# Patient Record
Sex: MALE | Race: WHITE | NOT HISPANIC OR LATINO | Employment: OTHER | URBAN - METROPOLITAN AREA
[De-identification: names, ages, dates, MRNs, and addresses within clinical notes are randomized per-mention and may not be internally consistent; named-entity substitution may affect disease eponyms.]

---

## 2017-05-19 ENCOUNTER — OFFICE VISIT (OUTPATIENT)
Dept: FAMILY MEDICINE CLINIC | Facility: CLINIC | Age: 82
End: 2017-05-19

## 2017-05-19 VITALS
HEART RATE: 69 BPM | BODY MASS INDEX: 22.82 KG/M2 | OXYGEN SATURATION: 98 % | DIASTOLIC BLOOD PRESSURE: 90 MMHG | WEIGHT: 163 LBS | TEMPERATURE: 98 F | SYSTOLIC BLOOD PRESSURE: 142 MMHG | HEIGHT: 71 IN

## 2017-05-19 DIAGNOSIS — D51.8 OTHER VITAMIN B12 DEFICIENCY ANEMIA: ICD-10-CM

## 2017-05-19 DIAGNOSIS — I10 ESSENTIAL HYPERTENSION: Primary | ICD-10-CM

## 2017-05-19 DIAGNOSIS — R19.7 DIARRHEA OF PRESUMED INFECTIOUS ORIGIN: ICD-10-CM

## 2017-05-19 LAB
ALBUMIN SERPL-MCNC: 3.9 G/DL (ref 3.5–5.2)
ALBUMIN/GLOB SERPL: 1.9 G/DL
ALP SERPL-CCNC: 53 U/L (ref 39–117)
ALT SERPL-CCNC: 12 U/L (ref 1–41)
AST SERPL-CCNC: 11 U/L (ref 1–40)
BASOPHILS # BLD AUTO: 0.04 10*3/MM3 (ref 0–0.2)
BASOPHILS NFR BLD AUTO: 0.6 % (ref 0–1.5)
BILIRUB SERPL-MCNC: 1 MG/DL (ref 0.1–1.2)
BUN SERPL-MCNC: 16 MG/DL (ref 8–23)
BUN/CREAT SERPL: 19.8 (ref 7–25)
CALCIUM SERPL-MCNC: 9.2 MG/DL (ref 8.2–9.6)
CHLORIDE SERPL-SCNC: 96 MMOL/L (ref 98–107)
CO2 SERPL-SCNC: 31.9 MMOL/L (ref 22–29)
CREAT SERPL-MCNC: 0.81 MG/DL (ref 0.76–1.27)
EOSINOPHIL # BLD AUTO: 0.08 10*3/MM3 (ref 0–0.7)
EOSINOPHIL NFR BLD AUTO: 1.2 % (ref 0.3–6.2)
ERYTHROCYTE [DISTWIDTH] IN BLOOD BY AUTOMATED COUNT: 14.8 % (ref 11.5–14.5)
GLOBULIN SER CALC-MCNC: 2.1 GM/DL
GLUCOSE SERPL-MCNC: 87 MG/DL (ref 65–99)
HCT VFR BLD AUTO: 40.2 % (ref 40.4–52.2)
HGB BLD-MCNC: 13.2 G/DL (ref 13.7–17.6)
IMM GRANULOCYTES # BLD: 0.02 10*3/MM3 (ref 0–0.03)
IMM GRANULOCYTES NFR BLD: 0.3 % (ref 0–0.5)
LYMPHOCYTES # BLD AUTO: 1.95 10*3/MM3 (ref 0.9–4.8)
LYMPHOCYTES NFR BLD AUTO: 28.9 % (ref 19.6–45.3)
MCH RBC QN AUTO: 30.8 PG (ref 27–32.7)
MCHC RBC AUTO-ENTMCNC: 32.8 G/DL (ref 32.6–36.4)
MCV RBC AUTO: 93.9 FL (ref 79.8–96.2)
MONOCYTES # BLD AUTO: 0.6 10*3/MM3 (ref 0.2–1.2)
MONOCYTES NFR BLD AUTO: 8.9 % (ref 5–12)
NEUTROPHILS # BLD AUTO: 4.06 10*3/MM3 (ref 1.9–8.1)
NEUTROPHILS NFR BLD AUTO: 60.1 % (ref 42.7–76)
PLATELET # BLD AUTO: 241 10*3/MM3 (ref 140–500)
POTASSIUM SERPL-SCNC: 4 MMOL/L (ref 3.5–5.2)
PROT SERPL-MCNC: 6 G/DL (ref 6–8.5)
RBC # BLD AUTO: 4.28 10*6/MM3 (ref 4.6–6)
SODIUM SERPL-SCNC: 139 MMOL/L (ref 136–145)
WBC # BLD AUTO: 6.75 10*3/MM3 (ref 4.5–10.7)

## 2017-05-19 PROCEDURE — 99213 OFFICE O/P EST LOW 20 MIN: CPT | Performed by: FAMILY MEDICINE

## 2017-08-23 ENCOUNTER — OFFICE VISIT (OUTPATIENT)
Dept: FAMILY MEDICINE CLINIC | Facility: CLINIC | Age: 82
End: 2017-08-23

## 2017-08-23 VITALS
SYSTOLIC BLOOD PRESSURE: 122 MMHG | HEART RATE: 75 BPM | OXYGEN SATURATION: 95 % | DIASTOLIC BLOOD PRESSURE: 78 MMHG | HEIGHT: 71 IN | BODY MASS INDEX: 21 KG/M2 | WEIGHT: 150 LBS | TEMPERATURE: 98.7 F

## 2017-08-23 DIAGNOSIS — N62 GYNECOMASTIA, MALE: Primary | ICD-10-CM

## 2017-08-23 PROCEDURE — 99213 OFFICE O/P EST LOW 20 MIN: CPT | Performed by: FAMILY MEDICINE

## 2017-08-23 NOTE — PROGRESS NOTES
Subjective   Nicolas Wilde is a 89 y.o. male presenting with   Chief Complaint   Patient presents with   • Lump     right breast         HPI Comments: This is a 89-year-old  white male nonsmoker who comes in today for evaluation of unilateral gynecomastia in the right breast.  He says that he has several lipomas and he is seen his dermatologist, Dr. Arnold, who thought it might possibly simply be a lipoma but that it deserves further evaluation.  We discussed the different imaging techniques and I told him that CT would involve some radiation an MRI takes a long time and is uncomfortable in the positioning normally used for breast MRI.  I told him I thought the simplest and easiest would be an with.       The following portions of the patient's history were reviewed and updated as appropriate: current medications, past family history, past medical history, past social history, past surgical history and problem list.    Review of Systems   Skin: Wound: breast lump.   All other systems reviewed and are negative.      Objective   Physical Exam   Constitutional: He is oriented to person, place, and time. He appears well-developed and well-nourished. No distress.   HENT:   Head: Normocephalic and atraumatic.   Eyes: EOM are normal. Pupils are equal, round, and reactive to light.   Neck: Normal range of motion. Neck supple. No thyromegaly present.   Musculoskeletal: Normal range of motion.   Lymphadenopathy:     He has no cervical adenopathy.   Neurological: He is alert and oriented to person, place, and time. No cranial nerve deficit. Coordination normal.   Skin: Skin is warm and dry. Lesion (3 cm soft well-circumscribed mobile but somewhat firm subcutaneous lesion beneath the right areola.) noted. He is not diaphoretic.        Psychiatric: He has a normal mood and affect. His behavior is normal.   Nursing note and vitals reviewed.      Assessment/Plan   Nicolas was seen today for lump.    Diagnoses and all  orders for this visit:    Gynecomastia, male  -     US breast right limited                   I would like him to return for another visit in 6 month(s)

## 2017-08-23 NOTE — PATIENT INSTRUCTIONS
This is a very nice 89 year old with unilateral gynecomastia.  I will request an ultrasound and notify him when the results are available.  I also will fax the results to his dermatologist, Dr. Arnold.

## 2017-08-25 DIAGNOSIS — N63.0 BREAST MASS IN MALE: Primary | ICD-10-CM

## 2017-08-28 DIAGNOSIS — N63.10 BREAST MASS, RIGHT: Primary | ICD-10-CM

## 2017-08-29 ENCOUNTER — HOSPITAL ENCOUNTER (OUTPATIENT)
Dept: MAMMOGRAPHY | Facility: HOSPITAL | Age: 82
Discharge: HOME | End: 2017-08-29
Admitting: FAMILY MEDICINE

## 2017-08-29 ENCOUNTER — HOSPITAL ENCOUNTER (OUTPATIENT)
Dept: ULTRASOUND IMAGING | Facility: HOSPITAL | Age: 82
Discharge: HOME | End: 2017-08-29

## 2017-08-29 DIAGNOSIS — N63.10 BREAST MASS, RIGHT: ICD-10-CM

## 2017-08-29 PROCEDURE — G0204 DX MAMMO INCL CAD BI: HCPCS

## 2017-08-29 PROCEDURE — 76642 ULTRASOUND BREAST LIMITED: CPT

## 2017-08-30 DIAGNOSIS — N63.10 BREAST MASS, RIGHT: Primary | ICD-10-CM

## 2017-09-12 ENCOUNTER — HOSPITAL ENCOUNTER (OUTPATIENT)
Dept: ULTRASOUND IMAGING | Facility: HOSPITAL | Age: 82
Discharge: HOME | End: 2017-09-12
Attending: SURGERY | Admitting: SURGERY

## 2017-09-12 VITALS
HEART RATE: 67 BPM | DIASTOLIC BLOOD PRESSURE: 90 MMHG | BODY MASS INDEX: 21.47 KG/M2 | RESPIRATION RATE: 18 BRPM | WEIGHT: 150 LBS | HEIGHT: 70 IN | SYSTOLIC BLOOD PRESSURE: 170 MMHG | OXYGEN SATURATION: 100 % | TEMPERATURE: 96.4 F

## 2017-09-12 DIAGNOSIS — R92.8 ABNORMAL MAMMOGRAM, UNSPECIFIED: ICD-10-CM

## 2017-09-12 PROCEDURE — 88342 IMHCHEM/IMCYTCHM 1ST ANTB: CPT | Performed by: SURGERY

## 2017-09-12 PROCEDURE — 88341 IMHCHEM/IMCYTCHM EA ADD ANTB: CPT | Performed by: SURGERY

## 2017-09-12 PROCEDURE — 88305 TISSUE EXAM BY PATHOLOGIST: CPT | Performed by: SURGERY

## 2017-09-12 RX ORDER — LIDOCAINE HYDROCHLORIDE 10 MG/ML
10 INJECTION, SOLUTION INFILTRATION; PERINEURAL ONCE
Status: COMPLETED | OUTPATIENT
Start: 2017-09-12 | End: 2017-09-12

## 2017-09-12 RX ADMIN — LIDOCAINE HYDROCHLORIDE 2 ML: 10 INJECTION, SOLUTION INFILTRATION; PERINEURAL at 14:48

## 2017-09-12 NOTE — NURSING NOTE
Biopsy site to right breast clear with Skin Affix dry and intact. No firmness or swelling noted at or around biopsy site. Denies pain. Ice pack with protective covering applied to biopsy site. Applied a wide Ace pressure wrap to hold ice pack in place. Discharge instructions discussed with understanding voiced by patient and his wife. Copies provided to patient. No distress noted. Wife in wheelchair and assisted to  via wheelchair per this nurse. Patient ambulatory along side nurse. To home via private vehicle.

## 2017-09-12 NOTE — NURSING NOTE
Correction to prior note. Call to Dr. Weston to advise that patient is ready to be consented and marked.

## 2017-09-15 ENCOUNTER — PREP FOR SURGERY (OUTPATIENT)
Dept: OTHER | Facility: HOSPITAL | Age: 82
End: 2017-09-15

## 2017-09-15 ENCOUNTER — TRANSCRIBE ORDERS (OUTPATIENT)
Dept: PERIOP | Facility: HOSPITAL | Age: 82
End: 2017-09-15

## 2017-09-15 DIAGNOSIS — C50.919 BREAST CANCER (HCC): Primary | ICD-10-CM

## 2017-09-15 DIAGNOSIS — C50.121 MALIGNANT NEOPLASM OF CENTRAL PORTION OF RIGHT MALE BREAST (HCC): Primary | ICD-10-CM

## 2017-09-15 RX ORDER — CLINDAMYCIN PHOSPHATE 900 MG/50ML
900 INJECTION INTRAVENOUS ONCE
Status: CANCELLED | OUTPATIENT
Start: 2017-09-20 | End: 2017-09-15

## 2017-09-19 ENCOUNTER — APPOINTMENT (OUTPATIENT)
Dept: PREADMISSION TESTING | Facility: HOSPITAL | Age: 82
End: 2017-09-19

## 2017-09-19 ENCOUNTER — PREP FOR SURGERY (OUTPATIENT)
Dept: OTHER | Facility: HOSPITAL | Age: 82
End: 2017-09-19

## 2017-09-19 ENCOUNTER — HOSPITAL ENCOUNTER (OUTPATIENT)
Dept: NUCLEAR MEDICINE | Facility: HOSPITAL | Age: 82
End: 2017-09-19
Attending: SURGERY

## 2017-09-19 VITALS
DIASTOLIC BLOOD PRESSURE: 81 MMHG | HEART RATE: 80 BPM | HEIGHT: 70 IN | BODY MASS INDEX: 23.19 KG/M2 | OXYGEN SATURATION: 98 % | SYSTOLIC BLOOD PRESSURE: 146 MMHG | RESPIRATION RATE: 16 BRPM | WEIGHT: 162 LBS | TEMPERATURE: 97.7 F

## 2017-09-19 DIAGNOSIS — C50.111 CANCER OF CENTRAL PORTION OF RIGHT BREAST (HCC): Primary | ICD-10-CM

## 2017-09-19 LAB
ANION GAP SERPL CALCULATED.3IONS-SCNC: 9.6 MMOL/L
BUN BLD-MCNC: 21 MG/DL (ref 8–23)
BUN/CREAT SERPL: 28.4 (ref 7–25)
CALCIUM SPEC-SCNC: 9.1 MG/DL (ref 8.2–9.6)
CHLORIDE SERPL-SCNC: 99 MMOL/L (ref 98–107)
CO2 SERPL-SCNC: 31.4 MMOL/L (ref 22–29)
CREAT BLD-MCNC: 0.74 MG/DL (ref 0.76–1.27)
DEPRECATED RDW RBC AUTO: 51.2 FL (ref 37–54)
ERYTHROCYTE [DISTWIDTH] IN BLOOD BY AUTOMATED COUNT: 14.3 % (ref 11.5–14.5)
GFR SERPL CREATININE-BSD FRML MDRD: 99 ML/MIN/1.73
GLUCOSE BLD-MCNC: 131 MG/DL (ref 65–99)
HCT VFR BLD AUTO: 41.3 % (ref 40.4–52.2)
HGB BLD-MCNC: 13.1 G/DL (ref 13.7–17.6)
MCH RBC QN AUTO: 30.8 PG (ref 27–32.7)
MCHC RBC AUTO-ENTMCNC: 31.7 G/DL (ref 32.6–36.4)
MCV RBC AUTO: 97.2 FL (ref 79.8–96.2)
PLATELET # BLD AUTO: 202 10*3/MM3 (ref 140–500)
PMV BLD AUTO: 9.8 FL (ref 6–12)
POTASSIUM BLD-SCNC: 3.5 MMOL/L (ref 3.5–5.2)
RBC # BLD AUTO: 4.25 10*6/MM3 (ref 4.6–6)
SODIUM BLD-SCNC: 140 MMOL/L (ref 136–145)
WBC NRBC COR # BLD: 6.83 10*3/MM3 (ref 4.5–10.7)

## 2017-09-19 PROCEDURE — 36415 COLL VENOUS BLD VENIPUNCTURE: CPT

## 2017-09-19 PROCEDURE — 93005 ELECTROCARDIOGRAM TRACING: CPT

## 2017-09-19 PROCEDURE — 80048 BASIC METABOLIC PNL TOTAL CA: CPT | Performed by: SURGERY

## 2017-09-19 PROCEDURE — 85027 COMPLETE CBC AUTOMATED: CPT | Performed by: SURGERY

## 2017-09-19 PROCEDURE — 93010 ELECTROCARDIOGRAM REPORT: CPT | Performed by: INTERNAL MEDICINE

## 2017-09-19 NOTE — DISCHARGE INSTRUCTIONS
Take the following medications the morning of surgery with a small sip of water: NONE        General Instructions:  • Do not eat or drink anything after midnight the night before surgery.  • Bring any papers given to you in the doctor’s office.  • Wear clean comfortable clothes and socks.  • Do not wear contact lenses or make-up.  Bring a case for your glasses.   • Bring crutches or walker if applicable.  • Leave all other valuables and jewelry at home.  • The Pre-Admission Testing nurse will instruct you to bring medications if unable to obtain an accurate list in Pre-Admission Testing.          Preventing a Surgical Site Infection:  • For 2 to 3 days before surgery, avoid shaving with a razor because the razor can irritate skin and make it easier to develop an infection.  • The night prior to surgery sleep in a clean bed with clean clothing.  Do not allow pets to sleep with you.  • Shower on the morning of surgery using a fresh bar of anti-bacterial soap (such as Dial) and clean washcloth.  Dry with a clean towel and dress in clean clothing.  • Ask your surgeon if you will be receiving antibiotics prior to surgery.  • Make sure you, your family, and all healthcare providers clean their hands with soap and water or an alcohol based hand  before caring for you or your wound.    Day of surgery: 9/20/2017. [facility] ARRIVAL TIME 630 AM  Upon arrival, a Pre-op nurse and Anesthesiologist will review your health history, obtain vital signs, and answer questions you may have.  The only belongings needed at this time will be your home medications and if applicable your C-PAP/BI-PAP machine.  If you are staying overnight your family can leave the rest of your belongings in the car and bring them to your room later.  A Pre-op nurse will start an IV and you may receive medication in preparation for surgery, including something to help you relax.  Your family will be able to see you in the Pre-op area.  While you are in  surgery your family should notify the waiting room  if they leave the waiting room area and provide a contact phone number.    Please be aware that surgery does come with discomfort.  We want to make every effort to control your discomfort so please discuss any uncontrolled symptoms with your nurse.   Your doctor will most likely have prescribed pain medications.      If you are going home after surgery you will receive individualized written care instructions before being discharged.  A responsible adult must drive you to and from the hospital on the day of your surgery and stay with you for 24 hours.        If you have any questions please call Pre-Admission Testing at [phone].  Deductibles and co-payments are collected on the day of service. Please be prepared to pay the required co-pay, deductible or deposit on the day of service as defined by your plan.

## 2017-09-19 NOTE — H&P
H&P    BUFFY MARSH  YOB: 1926  DATE OF SERVICE:  08/31/2017        CONSULTING PHYSICIAN:  Marito Cazares MD    REQUESTING PHYSICIAN:  Evans Porter MD    REASON FOR CONSULTATION:  Right breast mass.    HISTORY:  The patient is a 89-year-old young man, who still has a good quality of life and actually cares for his sick wife at home and who comes in with a mass in his right breast behind the nipple.  Apparently, he has not been eating well of late because he is doing so much to take care of his wife and he often forgets to eat especially when she does not eat.  He has lost a fair amount of weight related to that and it has resulted in a loss of chest wall fat and muscle.  This resulted in his ability to palpate a mass right behind the nipple on the right and then he went to his primary physician about the same.  He went and had a mammogram as well as ultrasound, which showed it to be a 1.8 x 2 cm abnormality that was confirmed at 1.6 cm on ultrasound and it was felt to be suspicious and biopsy was recommended.  Further questioning reveals no nipple discharge, no breast pain, no family history of breast disease or cancer.  He denies any trauma to the area.  He has never had any problems on the opposite side.  He has no axillary complaints.    PAST MEDICAL HISTORY:  Fairly unremarkable despite his age.  It looks like he does not take any chronic medications for chronic illness and has never had major surgery.    SOCIAL HISTORY:  The patient is retired, lives independently, and cares for his sick wife as mentioned above.  Drinks wine and quit meals.  Does not smoke.    FAMILY HISTORY:  Noncontributory to current condition.    REVIEW OF SYSTEMS:  A 12-point review of systems is reviewed, documented, and negative in the chart with the exception of some chronic back pain and some chronic hearing loss.    PHYSICAL EXAMINATION:  GENERAL:  Reveals an elderly gentleman, in no acute distress, alert,  oriented, and afebrile.  HEENT:  Head normocephalic.  External ears normal.  Sclerae nonicteric.  NECK:  Supple without thyromegaly.  CHEST:  Clear with good respiratory effort bilaterally.  Bilateral chest exam does reveal a retroareolar mass on the right about 2 cm in size, hard, but without any overlying abnormal skin changes.  There might be some retraction of the nipple.  Axillary region especially on the right are negative.  HEART:  Regular without murmur.  ABDOMEN:  Soft.  SKIN:  Negative.  RECTAL:  Deferred.    IMPRESSION:  Suspicious right breast mass.    PLAN:  I gave the patient several options, but I think if he is going to come to surgery being 89 years old one surgery would be better than two, so I think a minimally invasive biopsy might be his best bet moving forward.  It might even afford us the opportunity to miss surgery altogether.  I will schedule that procedure and further recommendations will follow.    ADDENDUM:  Bx +Ductal CA.  Discussed with pt and his oncologist and recommended simple mastectomy with sentinel node bx.          Marito Elliott M.D.  NATO: stephanie/vss

## 2017-09-20 ENCOUNTER — HOSPITAL ENCOUNTER (OUTPATIENT)
Dept: NUCLEAR MEDICINE | Facility: HOSPITAL | Age: 82
Discharge: HOME | End: 2017-09-20
Attending: SURGERY

## 2017-09-20 ENCOUNTER — ANESTHESIA EVENT (OUTPATIENT)
Dept: PERIOP | Facility: HOSPITAL | Age: 82
End: 2017-09-20

## 2017-09-20 ENCOUNTER — ANESTHESIA (OUTPATIENT)
Dept: PERIOP | Facility: HOSPITAL | Age: 82
End: 2017-09-20

## 2017-09-20 ENCOUNTER — HOSPITAL ENCOUNTER (OUTPATIENT)
Facility: HOSPITAL | Age: 82
Setting detail: HOSPITAL OUTPATIENT SURGERY
Discharge: HOME | End: 2017-09-20
Attending: SURGERY | Admitting: SURGERY

## 2017-09-20 VITALS
HEART RATE: 68 BPM | DIASTOLIC BLOOD PRESSURE: 71 MMHG | TEMPERATURE: 98.4 F | OXYGEN SATURATION: 97 % | SYSTOLIC BLOOD PRESSURE: 124 MMHG | RESPIRATION RATE: 16 BRPM

## 2017-09-20 DIAGNOSIS — C50.919 BREAST CANCER (HCC): ICD-10-CM

## 2017-09-20 DIAGNOSIS — C50.121 MALIGNANT NEOPLASM OF CENTRAL PORTION OF RIGHT MALE BREAST (HCC): ICD-10-CM

## 2017-09-20 PROCEDURE — 25010000002 DEXAMETHASONE PER 1 MG: Performed by: NURSE ANESTHETIST, CERTIFIED REGISTERED

## 2017-09-20 PROCEDURE — 88332 PATH CONSLTJ SURG EA ADD BLK: CPT | Performed by: SURGERY

## 2017-09-20 PROCEDURE — 88307 TISSUE EXAM BY PATHOLOGIST: CPT | Performed by: SURGERY

## 2017-09-20 PROCEDURE — 38792 RA TRACER ID OF SENTINL NODE: CPT

## 2017-09-20 PROCEDURE — 25010000002 NALOXONE PER 1 MG: Performed by: NURSE ANESTHETIST, CERTIFIED REGISTERED

## 2017-09-20 PROCEDURE — 88331 PATH CONSLTJ SURG 1 BLK 1SPC: CPT | Performed by: SURGERY

## 2017-09-20 PROCEDURE — A9541 TC99M SULFUR COLLOID: HCPCS | Performed by: SURGERY

## 2017-09-20 PROCEDURE — 25010000002 PROPOFOL 10 MG/ML EMULSION: Performed by: NURSE ANESTHETIST, CERTIFIED REGISTERED

## 2017-09-20 PROCEDURE — 25010000002 FENTANYL CITRATE (PF) 100 MCG/2ML SOLUTION: Performed by: NURSE ANESTHETIST, CERTIFIED REGISTERED

## 2017-09-20 PROCEDURE — 25010000002 ONDANSETRON PER 1 MG: Performed by: NURSE ANESTHETIST, CERTIFIED REGISTERED

## 2017-09-20 PROCEDURE — 0 TECHNETIUM FILTERED SULFUR COLLOID: Performed by: SURGERY

## 2017-09-20 PROCEDURE — 25010000002 FENTANYL CITRATE (PF) 100 MCG/2ML SOLUTION: Performed by: ANESTHESIOLOGY

## 2017-09-20 RX ORDER — MAGNESIUM HYDROXIDE 1200 MG/15ML
LIQUID ORAL AS NEEDED
Status: DISCONTINUED | OUTPATIENT
Start: 2017-09-20 | End: 2017-09-20 | Stop reason: HOSPADM

## 2017-09-20 RX ORDER — FAMOTIDINE 10 MG/ML
20 INJECTION, SOLUTION INTRAVENOUS ONCE
Status: COMPLETED | OUTPATIENT
Start: 2017-09-20 | End: 2017-09-20

## 2017-09-20 RX ORDER — PROMETHAZINE HYDROCHLORIDE 25 MG/ML
12.5 INJECTION, SOLUTION INTRAMUSCULAR; INTRAVENOUS ONCE AS NEEDED
Status: DISCONTINUED | OUTPATIENT
Start: 2017-09-20 | End: 2017-09-20 | Stop reason: HOSPADM

## 2017-09-20 RX ORDER — OXYCODONE AND ACETAMINOPHEN 7.5; 325 MG/1; MG/1
1 TABLET ORAL ONCE AS NEEDED
Status: DISCONTINUED | OUTPATIENT
Start: 2017-09-20 | End: 2017-09-20 | Stop reason: HOSPADM

## 2017-09-20 RX ORDER — DIPHENHYDRAMINE HYDROCHLORIDE 50 MG/ML
12.5 INJECTION INTRAMUSCULAR; INTRAVENOUS
Status: DISCONTINUED | OUTPATIENT
Start: 2017-09-20 | End: 2017-09-20 | Stop reason: HOSPADM

## 2017-09-20 RX ORDER — ONDANSETRON 2 MG/ML
4 INJECTION INTRAMUSCULAR; INTRAVENOUS ONCE AS NEEDED
Status: DISCONTINUED | OUTPATIENT
Start: 2017-09-20 | End: 2017-09-20 | Stop reason: HOSPADM

## 2017-09-20 RX ORDER — HYDRALAZINE HYDROCHLORIDE 20 MG/ML
5 INJECTION INTRAMUSCULAR; INTRAVENOUS
Status: DISCONTINUED | OUTPATIENT
Start: 2017-09-20 | End: 2017-09-20 | Stop reason: HOSPADM

## 2017-09-20 RX ORDER — HYDROCODONE BITARTRATE AND ACETAMINOPHEN 7.5; 325 MG/1; MG/1
1 TABLET ORAL EVERY 4 HOURS PRN
Qty: 25 TABLET | Refills: 0 | Status: SHIPPED | OUTPATIENT
Start: 2017-09-20 | End: 2017-10-15

## 2017-09-20 RX ORDER — PROMETHAZINE HYDROCHLORIDE 25 MG/1
12.5 TABLET ORAL ONCE AS NEEDED
Status: DISCONTINUED | OUTPATIENT
Start: 2017-09-20 | End: 2017-09-20 | Stop reason: HOSPADM

## 2017-09-20 RX ORDER — FENTANYL CITRATE 50 UG/ML
50 INJECTION, SOLUTION INTRAMUSCULAR; INTRAVENOUS
Status: DISCONTINUED | OUTPATIENT
Start: 2017-09-20 | End: 2017-09-20 | Stop reason: HOSPADM

## 2017-09-20 RX ORDER — PROMETHAZINE HYDROCHLORIDE 25 MG/1
25 TABLET ORAL ONCE AS NEEDED
Status: DISCONTINUED | OUTPATIENT
Start: 2017-09-20 | End: 2017-09-20 | Stop reason: HOSPADM

## 2017-09-20 RX ORDER — CLINDAMYCIN PHOSPHATE 900 MG/50ML
900 INJECTION INTRAVENOUS ONCE
Status: COMPLETED | OUTPATIENT
Start: 2017-09-20 | End: 2017-09-20

## 2017-09-20 RX ORDER — ROCURONIUM BROMIDE 10 MG/ML
INJECTION, SOLUTION INTRAVENOUS AS NEEDED
Status: DISCONTINUED | OUTPATIENT
Start: 2017-09-20 | End: 2017-09-20 | Stop reason: SURG

## 2017-09-20 RX ORDER — BUPIVACAINE HYDROCHLORIDE AND EPINEPHRINE 2.5; 5 MG/ML; UG/ML
INJECTION, SOLUTION INFILTRATION; PERINEURAL AS NEEDED
Status: DISCONTINUED | OUTPATIENT
Start: 2017-09-20 | End: 2017-09-20 | Stop reason: HOSPADM

## 2017-09-20 RX ORDER — LABETALOL HYDROCHLORIDE 5 MG/ML
5 INJECTION, SOLUTION INTRAVENOUS
Status: DISCONTINUED | OUTPATIENT
Start: 2017-09-20 | End: 2017-09-20 | Stop reason: HOSPADM

## 2017-09-20 RX ORDER — HYDROCODONE BITARTRATE AND ACETAMINOPHEN 7.5; 325 MG/1; MG/1
1 TABLET ORAL ONCE AS NEEDED
Status: DISCONTINUED | OUTPATIENT
Start: 2017-09-20 | End: 2017-09-20 | Stop reason: HOSPADM

## 2017-09-20 RX ORDER — NALOXONE HCL 0.4 MG/ML
0.2 VIAL (ML) INJECTION AS NEEDED
Status: DISCONTINUED | OUTPATIENT
Start: 2017-09-20 | End: 2017-09-20 | Stop reason: HOSPADM

## 2017-09-20 RX ORDER — FLUMAZENIL 0.1 MG/ML
0.2 INJECTION INTRAVENOUS AS NEEDED
Status: DISCONTINUED | OUTPATIENT
Start: 2017-09-20 | End: 2017-09-20 | Stop reason: HOSPADM

## 2017-09-20 RX ORDER — HYDROMORPHONE HYDROCHLORIDE 1 MG/ML
0.5 INJECTION, SOLUTION INTRAMUSCULAR; INTRAVENOUS; SUBCUTANEOUS
Status: DISCONTINUED | OUTPATIENT
Start: 2017-09-20 | End: 2017-09-20 | Stop reason: HOSPADM

## 2017-09-20 RX ORDER — PROPOFOL 10 MG/ML
VIAL (ML) INTRAVENOUS AS NEEDED
Status: DISCONTINUED | OUTPATIENT
Start: 2017-09-20 | End: 2017-09-20 | Stop reason: SURG

## 2017-09-20 RX ORDER — MIDAZOLAM HYDROCHLORIDE 1 MG/ML
1 INJECTION INTRAMUSCULAR; INTRAVENOUS
Status: DISCONTINUED | OUTPATIENT
Start: 2017-09-20 | End: 2017-09-20 | Stop reason: HOSPADM

## 2017-09-20 RX ORDER — SODIUM CHLORIDE, SODIUM LACTATE, POTASSIUM CHLORIDE, CALCIUM CHLORIDE 600; 310; 30; 20 MG/100ML; MG/100ML; MG/100ML; MG/100ML
9 INJECTION, SOLUTION INTRAVENOUS CONTINUOUS
Status: DISCONTINUED | OUTPATIENT
Start: 2017-09-20 | End: 2017-09-20 | Stop reason: HOSPADM

## 2017-09-20 RX ORDER — DEXAMETHASONE SODIUM PHOSPHATE 10 MG/ML
INJECTION INTRAMUSCULAR; INTRAVENOUS AS NEEDED
Status: DISCONTINUED | OUTPATIENT
Start: 2017-09-20 | End: 2017-09-20 | Stop reason: SURG

## 2017-09-20 RX ORDER — NALOXONE HYDROCHLORIDE 0.4 MG/ML
INJECTION, SOLUTION INTRAMUSCULAR; INTRAVENOUS; SUBCUTANEOUS AS NEEDED
Status: DISCONTINUED | OUTPATIENT
Start: 2017-09-20 | End: 2017-09-20 | Stop reason: SURG

## 2017-09-20 RX ORDER — ONDANSETRON 2 MG/ML
INJECTION INTRAMUSCULAR; INTRAVENOUS AS NEEDED
Status: DISCONTINUED | OUTPATIENT
Start: 2017-09-20 | End: 2017-09-20 | Stop reason: SURG

## 2017-09-20 RX ORDER — SODIUM CHLORIDE 0.9 % (FLUSH) 0.9 %
1-10 SYRINGE (ML) INJECTION AS NEEDED
Status: DISCONTINUED | OUTPATIENT
Start: 2017-09-20 | End: 2017-09-20 | Stop reason: HOSPADM

## 2017-09-20 RX ORDER — EPHEDRINE SULFATE 50 MG/ML
5 INJECTION, SOLUTION INTRAVENOUS ONCE AS NEEDED
Status: DISCONTINUED | OUTPATIENT
Start: 2017-09-20 | End: 2017-09-20 | Stop reason: HOSPADM

## 2017-09-20 RX ORDER — HYDROCODONE BITARTRATE AND ACETAMINOPHEN 7.5; 325 MG/1; MG/1
1 TABLET ORAL ONCE AS NEEDED
Status: COMPLETED | OUTPATIENT
Start: 2017-09-20 | End: 2017-09-20

## 2017-09-20 RX ORDER — MIDAZOLAM HYDROCHLORIDE 1 MG/ML
2 INJECTION INTRAMUSCULAR; INTRAVENOUS
Status: DISCONTINUED | OUTPATIENT
Start: 2017-09-20 | End: 2017-09-20 | Stop reason: HOSPADM

## 2017-09-20 RX ORDER — LIDOCAINE HYDROCHLORIDE 20 MG/ML
INJECTION, SOLUTION INFILTRATION; PERINEURAL AS NEEDED
Status: DISCONTINUED | OUTPATIENT
Start: 2017-09-20 | End: 2017-09-20 | Stop reason: SURG

## 2017-09-20 RX ORDER — PROMETHAZINE HYDROCHLORIDE 25 MG/1
25 SUPPOSITORY RECTAL ONCE AS NEEDED
Status: DISCONTINUED | OUTPATIENT
Start: 2017-09-20 | End: 2017-09-20 | Stop reason: HOSPADM

## 2017-09-20 RX ORDER — FENTANYL CITRATE 50 UG/ML
INJECTION, SOLUTION INTRAMUSCULAR; INTRAVENOUS AS NEEDED
Status: DISCONTINUED | OUTPATIENT
Start: 2017-09-20 | End: 2017-09-20 | Stop reason: SURG

## 2017-09-20 RX ADMIN — DEXAMETHASONE SODIUM PHOSPHATE 8 MG: 10 INJECTION INTRAMUSCULAR; INTRAVENOUS at 10:00

## 2017-09-20 RX ADMIN — HYDROCODONE BITARTRATE AND ACETAMINOPHEN 1 TABLET: 7.5; 325 TABLET ORAL at 11:14

## 2017-09-20 RX ADMIN — FENTANYL CITRATE 25 MCG: 50 INJECTION INTRAMUSCULAR; INTRAVENOUS at 11:45

## 2017-09-20 RX ADMIN — SUGAMMADEX 2 ML: 100 INJECTION, SOLUTION INTRAVENOUS at 10:31

## 2017-09-20 RX ADMIN — FENTANYL CITRATE 50 MCG: 50 INJECTION INTRAMUSCULAR; INTRAVENOUS at 10:12

## 2017-09-20 RX ADMIN — SODIUM CHLORIDE, POTASSIUM CHLORIDE, SODIUM LACTATE AND CALCIUM CHLORIDE 9 ML/HR: 600; 310; 30; 20 INJECTION, SOLUTION INTRAVENOUS at 11:46

## 2017-09-20 RX ADMIN — SODIUM CHLORIDE, POTASSIUM CHLORIDE, SODIUM LACTATE AND CALCIUM CHLORIDE 9 ML/HR: 600; 310; 30; 20 INJECTION, SOLUTION INTRAVENOUS at 07:50

## 2017-09-20 RX ADMIN — FENTANYL CITRATE 50 MCG: 50 INJECTION INTRAMUSCULAR; INTRAVENOUS at 09:45

## 2017-09-20 RX ADMIN — NALOXONE HYDROCHLORIDE 0.2 MG: 0.4 INJECTION, SOLUTION INTRAMUSCULAR; INTRAVENOUS; SUBCUTANEOUS at 10:43

## 2017-09-20 RX ADMIN — TECHNETIUM TC 99M SULFUR COLLOID 1 DOSE: KIT at 09:00

## 2017-09-20 RX ADMIN — FENTANYL CITRATE 50 MCG: 50 INJECTION INTRAMUSCULAR; INTRAVENOUS at 09:32

## 2017-09-20 RX ADMIN — ROCURONIUM BROMIDE 40 MG: 10 INJECTION INTRAVENOUS at 09:37

## 2017-09-20 RX ADMIN — LIDOCAINE HYDROCHLORIDE 60 MG: 20 INJECTION, SOLUTION INFILTRATION; PERINEURAL at 09:37

## 2017-09-20 RX ADMIN — EPHEDRINE SULFATE 10 MG: 50 INJECTION INTRAMUSCULAR; INTRAVENOUS; SUBCUTANEOUS at 10:23

## 2017-09-20 RX ADMIN — FENTANYL CITRATE 25 MCG: 50 INJECTION INTRAMUSCULAR; INTRAVENOUS at 10:54

## 2017-09-20 RX ADMIN — FENTANYL CITRATE 25 MCG: 50 INJECTION INTRAMUSCULAR; INTRAVENOUS at 11:17

## 2017-09-20 RX ADMIN — FAMOTIDINE 20 MG: 10 INJECTION, SOLUTION INTRAVENOUS at 08:44

## 2017-09-20 RX ADMIN — ONDANSETRON 4 MG: 2 INJECTION INTRAMUSCULAR; INTRAVENOUS at 10:31

## 2017-09-20 RX ADMIN — CLINDAMYCIN PHOSPHATE 900 MG: 900 INJECTION INTRAVENOUS at 09:31

## 2017-09-20 RX ADMIN — SODIUM CHLORIDE, POTASSIUM CHLORIDE, SODIUM LACTATE AND CALCIUM CHLORIDE: 600; 310; 30; 20 INJECTION, SOLUTION INTRAVENOUS at 09:30

## 2017-09-20 RX ADMIN — PROPOFOL 100 MG: 10 INJECTION, EMULSION INTRAVENOUS at 09:37

## 2017-09-20 RX ADMIN — FENTANYL CITRATE 50 MCG: 50 INJECTION INTRAMUSCULAR; INTRAVENOUS at 11:35

## 2017-09-20 RX ADMIN — FENTANYL CITRATE 25 MCG: 50 INJECTION INTRAMUSCULAR; INTRAVENOUS at 10:57

## 2017-09-20 NOTE — PLAN OF CARE
Problem: Patient Care Overview (Adult)  Goal: Plan of Care Review  Outcome: Ongoing (interventions implemented as appropriate)    09/20/17 1139   Coping/Psychosocial Response Interventions   Plan Of Care Reviewed With patient   Patient Care Overview   Progress improving   Outcome Evaluation   Outcome Summary/Follow up Plan vss, pain level tolerable, no nausea       Goal: Adult Individualization and Mutuality  Outcome: Ongoing (interventions implemented as appropriate)    09/20/17 1139   Individualization   Patient Specific Preferences wears hearing aids       Goal: Discharge Needs Assessment  Outcome: Ongoing (interventions implemented as appropriate)    09/20/17 1139   Discharge Needs Assessment   Concerns To Be Addressed no discharge needs identified         Problem: Perioperative Period (Adult)  Goal: Signs and Symptoms of Listed Potential Problems Will be Absent or Manageable (Perioperative Period)  Outcome: Ongoing (interventions implemented as appropriate)    09/20/17 1139   Perioperative Period   Problems Assessed (Perioperative Period) all   Problems Present (Perioperative Period) pain

## 2017-09-20 NOTE — ANESTHESIA PREPROCEDURE EVALUATION
Anesthesia Evaluation     Patient summary reviewed and Nursing notes reviewed          Airway   Mallampati: II  TM distance: <3 FB  Neck ROM: full  no difficulty expected  Dental - normal exam     Pulmonary - normal exam   (+) a smoker,   Cardiovascular - negative cardio ROS and normal exam    ECG reviewed  Rhythm: regular  Rate: normal        Neuro/Psych- negative ROS  GI/Hepatic/Renal/Endo - negative ROS     Musculoskeletal (-) negative ROS    Abdominal  - normal exam    Bowel sounds: normal.   Substance History - negative use     OB/GYN negative ob/gyn ROS         Other                                        Anesthesia Plan    ASA 3     general     intravenous induction   Anesthetic plan and risks discussed with patient.

## 2017-09-20 NOTE — DISCHARGE INSTRUCTIONS
Outpatient Surgery Guidelines, Adult  Outpatient procedures are those for which the person having the procedure is allowed to go home the same day as the procedure. Various procedures are done on an outpatient basis. You should follow some general guidelines if you will be having an outpatient procedure.    AFTER THE PROCEDURE  After surgery, you will be taken to a recovery area, where your progress will be monitored. If there are no complications, you will be allowed to go home when you are awake, stable, and taking fluids well. You may have numbness around the surgical site. Healing will take some time. You will have tenderness at the surgical site and may have some swelling and bruising. You may also have some nausea.  HOME CARE INSTRUCTIONS  · Do not drive for 24 hours, or as directed by your health care provider. Do not drive while taking prescription pain medicines.  · Do not drink alcohol for 24 hours.  · Do not make important decisions or sign legal documents for 24 hours.  · You may resume a normal diet and activities as directed.  · Do not lift anything heavier than 10 pounds (4.5 kg) or play contact sports until your health care provider says it is okay.  · Change your bandages (dressings) as directed.  · Only take over-the-counter or prescription medicines as directed by your health care provider.  · Follow up with your health care provider as directed.  SEEK MEDICAL CARE IF:  · You have increased bleeding (more than a small spot) from the surgical site.  · You have redness, swelling, or increasing pain in the wound.  · You see pus coming from the wound.  · You have a fever.  · You notice a bad smell coming from the wound or dressing.  · You feel lightheaded or faint.  · You develop a rash.  · You have trouble breathing.  · You develop allergies.  MAKE SURE YOU:  · Understand these instructions.  · Will watch your condition.  · Will get help right away if you are not doing well or get worse.     This  information is not intended to replace advice given to you by your health care provider. Make sure you discuss any questions you have with your health care provider.     Document Released: 09/12/2002 Document Revised: 05/03/2016 Document Reviewed: 05/22/2014  [employer] Patient Education ©2016 [employer]

## 2017-09-20 NOTE — PLAN OF CARE
Problem: Patient Care Overview (Adult)  Goal: Discharge Needs Assessment  Outcome: Outcome(s) achieved Date Met:  09/20/17 09/20/17 1203   Discharge Needs Assessment   Concerns To Be Addressed no discharge needs identified   Equipment Needed After Discharge none   Discharge Disposition home or self-care   Current Health   Anticipated Changes Related to Illness none   Self-Care   Equipment Currently Used at Home none   Living Environment   Transportation Available car;family or friend will provide

## 2017-09-20 NOTE — PLAN OF CARE
Problem: Perioperative Period (Adult)  Goal: Signs and Symptoms of Listed Potential Problems Will be Absent or Manageable (Perioperative Period)  Outcome: Ongoing (interventions implemented as appropriate)    09/20/17 0711   Perioperative Period   Problems Assessed (Perioperative Period) all   Problems Present (Perioperative Period) none

## 2017-09-20 NOTE — ANESTHESIA POSTPROCEDURE EVALUATION
Patient: Nicolas Wilde    Procedure Summary     Date Anesthesia Start Anesthesia Stop Room / Location    09/20/17 0930 1057  [facility] /  [facility]       Procedure Diagnosis Surgeon Provider    BREAST MASTECTOMY WITH SENTINEL NODE BIOPSY, RIGHT (Right Breast) Malignant neoplasm of central portion of right male breast  (Malignant neoplasm of central portion of right male breast [C50.121]) MD Yung Vivas MD          Anesthesia Type: general  Last vitals  BP   134/82 (09/20/17 1130)    Temp   36.4 °C (97.5 °F) (09/20/17 1053)    Pulse   82 (09/20/17 1130)   Resp   16 (09/20/17 1130)    SpO2   99 % (09/20/17 1130)      Post Anesthesia Care and Evaluation    Patient location during evaluation: PACU  Patient participation: complete - patient participated  Level of consciousness: awake and alert  Pain management: adequate  Airway patency: patent  Anesthetic complications: No anesthetic complications    Cardiovascular status: acceptable  Respiratory status: acceptable  Hydration status: acceptable    Comments: --------------------            09/20/17               1130     --------------------   BP:       134/82     Pulse:      82       Resp:       16       Temp:                SpO2:      99%      --------------------

## 2017-09-20 NOTE — ANESTHESIA PROCEDURE NOTES
Airway  Urgency: elective    Date/Time: 9/20/2017 9:40 AM  Airway not difficult    General Information and Staff    Patient location during procedure: OR  Anesthesiologist: MARYLIN WATTS  CRNA: VIRGINIA MOTA    Indications and Patient Condition  Indications for airway management: airway protection    Preoxygenated: yes  MILS not maintained throughout  Mask difficulty assessment: 1 - vent by mask    Final Airway Details  Final airway type: endotracheal airway      Successful airway: ETT  Cuffed: yes   Successful intubation technique: direct laryngoscopy  Facilitating devices/methods: anterior pressure/BURP  Endotracheal tube insertion site: oral  Blade: Dahlia  Blade size: #3  ETT size: 8.0 mm  Cormack-Lehane Classification: grade IIb - view of arytenoids or posterior of glottis only  Placement verified by: chest auscultation and capnometry   Cuff volume (mL): 8  Measured from: lips  ETT to lips (cm): 23  Number of attempts at approach: 1    Additional Comments  Pt preoxygenated prior to induction, easy mask airway, atraumatic intubation,+ ETCO2, + bs bilat,  ETT secured and connected to ventilator.

## 2017-09-20 NOTE — PLAN OF CARE
Problem: Patient Care Overview (Adult)  Goal: Discharge Needs Assessment  Outcome: Ongoing (interventions implemented as appropriate)    09/20/17 0711   Discharge Needs Assessment   Concerns To Be Addressed no discharge needs identified   Equipment Needed After Discharge none   Discharge Disposition home or self-care   Current Health   Anticipated Changes Related to Illness none   Self-Care   Equipment Currently Used at Home none   Living Environment   Transportation Available car;family or friend will provide

## 2017-09-20 NOTE — PLAN OF CARE
Problem: Patient Care Overview (Adult)  Goal: Plan of Care Review  Outcome: Ongoing (interventions implemented as appropriate)    09/20/17 0712   Coping/Psychosocial Response Interventions   Plan Of Care Reviewed With patient   Patient Care Overview   Progress improving

## 2017-09-20 NOTE — PLAN OF CARE
Problem: Patient Care Overview (Adult)  Goal: Plan of Care Review  Outcome: Outcome(s) achieved Date Met:  09/20/17 09/20/17 1204   Coping/Psychosocial Response Interventions   Plan Of Care Reviewed With patient   Patient Care Overview   Progress improving

## 2017-09-20 NOTE — PLAN OF CARE
Problem: Perioperative Period (Adult)  Goal: Signs and Symptoms of Listed Potential Problems Will be Absent or Manageable (Perioperative Period)  Outcome: Outcome(s) achieved Date Met:  09/20/17 09/20/17 1202   Perioperative Period   Problems Assessed (Perioperative Period) all   Problems Present (Perioperative Period) none

## 2017-09-20 NOTE — OP NOTE
"September 20, 2017    Nicolas Wilde  [id number]    PROCEDURE: Right breast Lymphazurin blue dye injection, right breast simple mastectomy and right deep axillary sentinel node biopsy with layered closure.    PREOPERATIVE DIAGNOSIS:  Right breast adenocarcinoma.    POSTOPERATIVE DIAGNOSIS:  Same.    SURGEON:  Marito Cazares M.D.    ASSISTANT:  None.    ANESTHESIA:  Gen. endotracheal local.    ESTIMATED BLOOD LOSS:  Minimal.    SPECIMENS:  Right breast and right axillary sentinel node.    INDICATIONS:  Patient is a 89-year-old young man who noticed a suspicious abnormality involving his right nipple.  Ultrasound were suspicious so minimally invasive biopsy was performed.  Biopsy confirmed adenocarcinoma.  Patient presents now for definitive surgical therapy of the malignancy.  Risks and benefits were discussed preoperatively and questions were answered to his satisfaction.    PROCEDURE:  Patient was brought to the operating room after appropriate injection of nuclear dye in the nuclear department.  He is placed supine on the operating room table and general endotracheal anesthesia was established.  5 cc of Lymphazurin blue dye was injected at the skin areola border subdermally between 9:00 and 12:00 on the right breast.  I massaged the breast for 5 minutes.  Breast chest axilla and shoulder girdle were all prepped and draped in normal sterile fashion.  I started by putting a neoprobe which identified the anticipated \"hot spot\" in the upper outer portion of the right breast and I used the guide me into the axilla where I made a skin incision with cautery and I used the same as well as a hemostat and sharply and bluntly dissected respectively up into the axilla.  The neoprobe was used to guide me towards the sentinel node.  I then put a figure-of-eight 2-0 silk suture and the sentinel node to elevate it and then I dissected free from surrounding tissue with cautery.  Looked like 2 nodes were removed in total.  The " "nodes were sent for pathologic evaluation.  The wound was packed with a sponge.  I made an elliptical incision 4\" x 1.5\" around the nipple and areola oriented obliquely with cautery.  I then elevated skin flap superiorly medially and inferiorly with cautery and countertraction.  I took the breast tissue off the underlying pectoralis muscle at the level of the fascia.  I then amputated the breast off the lateral chest wall inferior to superior in the fascial plane as well.  The specimen was sent for permanent evaluation.  Local anesthetic was used for injection.  A layered closure was performed with interrupted 2-0 Vicryl followed by a running 4-0 Vicryl followed by benzoin Steri-Strips gauze and a pressure dressing.  Pathology called back and said the frozen section on the lymph node was negative so I then injected more local and closed the axillary incision with interrupted 2-0 Vicryl suture followed by running 4-0 Vicryl suture benzoin stitches should gauze and a Tegaderm were applied.  Anesthesia was reversed and he'll be sent home today with outpatient follow-up planned.      Marito Cazares M.D.  "

## 2017-09-21 LAB
CYTO UR: NORMAL
LAB AP CASE REPORT: NORMAL
Lab: NORMAL
Lab: NORMAL
PATH REPORT.FINAL DX SPEC: NORMAL
PATH REPORT.GROSS SPEC: NORMAL

## 2017-09-21 NOTE — PROGRESS NOTES
Please call the patient regarding his abnormal result.  I spoke with citlalli and he said things are being taken care of  By Dr. Escalona's office

## 2017-09-24 LAB
CYTO UR: NORMAL
LAB AP CASE REPORT: NORMAL
LAB AP CLINICAL INFORMATION: NORMAL
LAB AP SPECIAL STAINS: NORMAL
Lab: NORMAL
Lab: NORMAL
PATH REPORT.ADDENDUM SPEC: NORMAL
PATH REPORT.FINAL DX SPEC: NORMAL
PATH REPORT.GROSS SPEC: NORMAL

## 2017-10-19 ENCOUNTER — CONSULT (OUTPATIENT)
Dept: ONCOLOGY | Facility: CLINIC | Age: 82
End: 2017-10-19

## 2017-10-19 ENCOUNTER — LAB (OUTPATIENT)
Dept: LAB | Facility: HOSPITAL | Age: 82
End: 2017-10-19

## 2017-10-19 ENCOUNTER — APPOINTMENT (OUTPATIENT)
Dept: ONCOLOGY | Facility: CLINIC | Age: 82
End: 2017-10-19

## 2017-10-19 VITALS
DIASTOLIC BLOOD PRESSURE: 72 MMHG | BODY MASS INDEX: 23.52 KG/M2 | RESPIRATION RATE: 16 BRPM | HEIGHT: 69 IN | TEMPERATURE: 98.1 F | SYSTOLIC BLOOD PRESSURE: 120 MMHG | WEIGHT: 158.8 LBS | HEART RATE: 68 BPM

## 2017-10-19 DIAGNOSIS — I10 ESSENTIAL HYPERTENSION: Primary | ICD-10-CM

## 2017-10-19 DIAGNOSIS — C50.121 MALIGNANT NEOPLASM OF CENTRAL PORTION OF RIGHT BREAST IN MALE, ESTROGEN RECEPTOR POSITIVE (HCC): Primary | ICD-10-CM

## 2017-10-19 DIAGNOSIS — Z17.0 MALIGNANT NEOPLASM OF CENTRAL PORTION OF RIGHT BREAST IN MALE, ESTROGEN RECEPTOR POSITIVE (HCC): Primary | ICD-10-CM

## 2017-10-19 LAB
BASOPHILS # BLD AUTO: 0.06 10*3/MM3 (ref 0–0.1)
BASOPHILS NFR BLD AUTO: 0.7 % (ref 0–1.1)
DEPRECATED RDW RBC AUTO: 47.2 FL (ref 37–49)
EOSINOPHIL # BLD AUTO: 0.14 10*3/MM3 (ref 0–0.36)
EOSINOPHIL NFR BLD AUTO: 1.7 % (ref 1–5)
ERYTHROCYTE [DISTWIDTH] IN BLOOD BY AUTOMATED COUNT: 13.8 % (ref 11.7–14.5)
HCT VFR BLD AUTO: 43.3 % (ref 40–49)
HGB BLD-MCNC: 14.4 G/DL (ref 13.5–16.5)
IMM GRANULOCYTES # BLD: 0.07 10*3/MM3 (ref 0–0.03)
IMM GRANULOCYTES NFR BLD: 0.9 % (ref 0–0.5)
LYMPHOCYTES # BLD AUTO: 1.67 10*3/MM3 (ref 1–3.5)
LYMPHOCYTES NFR BLD AUTO: 20.8 % (ref 20–49)
MCH RBC QN AUTO: 31 PG (ref 27–33)
MCHC RBC AUTO-ENTMCNC: 33.3 G/DL (ref 32–35)
MCV RBC AUTO: 93.1 FL (ref 83–97)
MONOCYTES # BLD AUTO: 0.74 10*3/MM3 (ref 0.25–0.8)
MONOCYTES NFR BLD AUTO: 9.2 % (ref 4–12)
NEUTROPHILS # BLD AUTO: 5.35 10*3/MM3 (ref 1.5–7)
NEUTROPHILS NFR BLD AUTO: 66.7 % (ref 39–75)
NRBC BLD MANUAL-RTO: 0 /100 WBC (ref 0–0)
PLATELET # BLD AUTO: 172 10*3/MM3 (ref 150–375)
PMV BLD AUTO: 10.6 FL (ref 8.9–12.1)
RBC # BLD AUTO: 4.65 10*6/MM3 (ref 4.3–5.5)
WBC NRBC COR # BLD: 8.03 10*3/MM3 (ref 4–10)

## 2017-10-19 PROCEDURE — 36416 COLLJ CAPILLARY BLOOD SPEC: CPT | Performed by: INTERNAL MEDICINE

## 2017-10-19 PROCEDURE — 99203 OFFICE O/P NEW LOW 30 MIN: CPT | Performed by: INTERNAL MEDICINE

## 2017-10-19 PROCEDURE — 85025 COMPLETE CBC W/AUTO DIFF WBC: CPT | Performed by: INTERNAL MEDICINE

## 2017-10-19 RX ORDER — TAMOXIFEN CITRATE 20 MG/1
20 TABLET ORAL DAILY
Qty: 30 TABLET | Refills: 3 | Status: SHIPPED | OUTPATIENT
Start: 2017-10-19 | End: 2017-11-18

## 2017-10-19 NOTE — PROGRESS NOTES
Subjective patient did well with surgery, rapid recovery    REASON FOR CONSULTATION:  Male breast cancer  Provide an opinion on any further workup or treatment                             REQUESTING PHYSICIAN:  Evans Porter M.D., Marito Moser M.D.    RECORDS OBTAINED:  Records of the patients history including those obtained from the referring provider were reviewed and summarized in detail.    HISTORY OF PRESENT ILLNESS:  The patient is a 89 y.o. year old male who is here for an opinion about the above issue.    History of Present Illness       Patient is a 89-year-old male followed by primary care with hypertension, B12 deficiency and hyperlipidemia.  He had presented to his physician August 23 with development of gynecomastia in the right breast.  He had previous several lipomas and saw his dermatologist who felt this might be a similar issue but that it should be assessed and ultimately additional exams were performed.  These included mammogram and ultrasound August 29 demonstrating a localized mass in the retroareolar right breast and associated nipple retraction and thickening measuring up to 1.8 x 2 cm correlating with right breast ultrasound when voiding subareolar mass was also found measuring 1.1 x 1.6.  This led to biopsy September 12, 2017 which was consistent with an invasive mammary carcinoma, intermediate grade with cancer involving multiple fragments spanning at least 8 mm.Biomarker analysis included ER of 90% and MN 50%, HER-2 2+ leading to additional testing-HER-2/CEP 17 of 1.1 interpreted as negative.   The patient was seen by Dr. SAEED Moser of general surgery September 19 . it was decided to proceed with simple mastectomy and sentinel node biopsy.  This was performed on September 20.  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  The sentinel lymph node examined was negative  with his subsequent staging being PT1c PN 0 MX-stage I disease .  The patient is seen after surgery and has recovered relatively quickly with little additional pain medication.  Now presents for concerns for adjuvant therapy.    Past Medical History:   Diagnosis Date   • Arthritis    • BPH (benign prostatic hyperplasia)    • Breast cancer     RIGHT   • Chronic low back pain    • Hard of hearing     LOKESH HEARING AIDS   • Melanoma     FROM BACK   • Prostate cancer     HISTORY RADIATION TREATMENT   • Seasonal allergies         Past Surgical History:   Procedure Laterality Date   • HERNIA REPAIR      LEFT AND RIGHT   • LAPAROSCOPIC CHOLECYSTECTOMY     • LUMBAR DECOMPRESSION     • MASTECTOMY Right 9/20/2017    Procedure: BREAST MASTECTOMY WITH SENTINEL NODE BIOPSY, RIGHT;  Surgeon: Marito Cazares MD;  Location: [facility] OR [facility];  Service:    • SKIN CANCER EXCISION      SEVERAL MELANOMA REMOVED FROM BACK        Current Outpatient Prescriptions on File Prior to Visit   Medication Sig Dispense Refill   • Turmeric (CURCUMIN 95 PO) Take  by mouth. HELD FOR SURGERY       No current facility-administered medications on file prior to visit.         ALLERGIES:    Allergies   Allergen Reactions   • Penicillins Anaphylaxis        Social History     Social History   • Marital status:      Spouse name: Natali   • Number of children: N/A   • Years of education: N/A     Occupational History   •  Retired     Social History Main Topics   • Smoking status: Former Smoker   • Smokeless tobacco: Never Used      Comment: ONLY FOR COUPLE YEARS QUIT EARLY 50'S   • Alcohol use Yes      Comment: occasional   • Drug use: No   • Sexual activity: Not Asked     Other Topics Concern   • None     Social History Narrative        Family History   Problem Relation Age of Onset   • Prostate cancer Father    • Malig Hyperthermia Neg Hx         Review of Systems   Gen.:, Weight loss noted after recent dietary change made as a result of needs of  "wife  HEENT: Bilateral deafness  10 additional systems negative except for periodic low back pain recognized    Objective     Vitals:    10/19/17 1201   Weight: 158 lb 12.8 oz (72 kg)   Height: 68.9\" (175 cm)   PainSc: 0-No pain     Current Status 10/19/2017   ECOG score 0       Physical Exam   Constitutional: He is oriented to person, place, and time. He appears well-developed and well-nourished. No distress.   HENT:   Head: Normocephalic and atraumatic.   Eyes: EOM are normal. Pupils are equal, round, and reactive to light.   Neck: Normal range of motion. Neck supple. No thyromegaly present.   Musculoskeletal: Normal range of motion.   Lymphadenopathy: no cervical, supraclavicular or axillary adenopathy.  Chest wall: Patient status post right simple mastectomy with healing suture line, no additional discharge or masses noted  Cardiovascular: Normal rate, regular rhythm, 2/6 systolic ejection murmur noted at apex   Neurological: He is alert and oriented to person, place, and time. No cranial nerve deficit. Coordination normal.         RECENT LABS:  Hematology WBC   Date Value Ref Range Status   10/19/2017 8.03 4.00 - 10.00 10*3/mm3 Final     RBC   Date Value Ref Range Status   10/19/2017 4.65 4.30 - 5.50 10*6/mm3 Final     Hemoglobin   Date Value Ref Range Status   10/19/2017 14.4 13.5 - 16.5 g/dL Final     Hematocrit   Date Value Ref Range Status   10/19/2017 43.3 40.0 - 49.0 % Final     Platelets   Date Value Ref Range Status   10/19/2017 172 150 - 375 10*3/mm3 Final          Assessment/Plan        89-year-old male followed with hypertension and previous surgeries including cholecystectomy and previous surgery for spinal stenosis who noted in the last several months development of unilateral gynecomastia on the right.  This was reviewed by physicians including his dermatologist to previously had assessed for lipomas but felt that this was a new abnormality.  His subsequent studies went on to reveal a mass " localized in the retroareolar right breast with associated nipple retraction and thickening measuring up to 2 cm.  Subsequent biopsy was consistent with invasive mammary carcinoma of intermediate grade, ER positive at 90%, GA at 50% and HER-2 negative.  This was surgically addressed by Gen. surgery with simple mastectomy and sentinel node biopsy September 20.  The patient did extraordinarily well perioperatively has had no additional complications.  His subsequent pathology review reveals a stage I breast cancer- [id number].    The patient is seen for consultation October 19 and is reviewed with him the treatment of this particular disease-early stage breast cancer-is addressed the same in both men and women.  We reviewed his prognosis which is actually felt to be overall good plan that which could be improved by using adjuvant therapy and, in particular, the use of Tamoxifen.  He is not expected to have any serious complications to use this medication and is not on any additional medications or therapies that would interfere with its use.  He therefore agrees to a trial with plan to initiate 20 mg daily anticipating use for up to 5 years.  He is agreeable to this plan and will be seen back in the next 4-6 weeks coordinate his schedule with his wife was also seen in the practice.  Thank you very much for allowing me see this patient consultation and please let me know if any questions concerning this case.

## 2017-12-04 ENCOUNTER — TELEPHONE (OUTPATIENT)
Dept: ONCOLOGY | Facility: HOSPITAL | Age: 82
End: 2017-12-04

## 2017-12-04 NOTE — TELEPHONE ENCOUNTER
pts son, Carter Wilde, called today from [place].  His mom and his dad both have appts tomorrow in our office.  Mr Wilde is seeing Dr Todd.  Mr Wilde's son called with concern because he has noted his father to be more fatigued/tired than normal and wanted to make sure Dr Todd was aware of that.  He also has asked both of his parents about getting the flu shot and they say they always forget to ask.  I informed Carter that I sent a note to Dr Todd for tomorrows appt so he will be aware of his concerns.

## 2017-12-05 ENCOUNTER — LAB (OUTPATIENT)
Dept: OTHER | Facility: HOSPITAL | Age: 82
End: 2017-12-05

## 2017-12-05 ENCOUNTER — OFFICE VISIT (OUTPATIENT)
Dept: ONCOLOGY | Facility: CLINIC | Age: 82
End: 2017-12-05

## 2017-12-05 VITALS
RESPIRATION RATE: 16 BRPM | SYSTOLIC BLOOD PRESSURE: 168 MMHG | OXYGEN SATURATION: 100 % | DIASTOLIC BLOOD PRESSURE: 98 MMHG | HEIGHT: 69 IN | TEMPERATURE: 97.7 F | BODY MASS INDEX: 22.22 KG/M2 | HEART RATE: 66 BPM | WEIGHT: 150 LBS

## 2017-12-05 DIAGNOSIS — C50.121 MALIGNANT NEOPLASM OF CENTRAL PORTION OF RIGHT BREAST IN MALE, ESTROGEN RECEPTOR POSITIVE (HCC): Primary | ICD-10-CM

## 2017-12-05 DIAGNOSIS — Z17.0 MALIGNANT NEOPLASM OF CENTRAL PORTION OF RIGHT BREAST IN MALE, ESTROGEN RECEPTOR POSITIVE (HCC): Primary | ICD-10-CM

## 2017-12-05 LAB
BASOPHILS # BLD AUTO: 0.06 10*3/MM3 (ref 0–0.2)
BASOPHILS NFR BLD AUTO: 0.7 % (ref 0–1.5)
DEPRECATED RDW RBC AUTO: 49.5 FL (ref 37–54)
EOSINOPHIL # BLD AUTO: 0.14 10*3/MM3 (ref 0–0.7)
EOSINOPHIL NFR BLD AUTO: 1.6 % (ref 0.3–6.2)
ERYTHROCYTE [DISTWIDTH] IN BLOOD BY AUTOMATED COUNT: 14.1 % (ref 11.5–14.5)
HCT VFR BLD AUTO: 41 % (ref 40.4–52.2)
HGB BLD-MCNC: 13.4 G/DL (ref 13.7–17.6)
IMM GRANULOCYTES # BLD: 0.04 10*3/MM3 (ref 0–0.03)
IMM GRANULOCYTES NFR BLD: 0.5 % (ref 0–0.5)
LYMPHOCYTES # BLD AUTO: 2.57 10*3/MM3 (ref 0.9–4.8)
LYMPHOCYTES NFR BLD AUTO: 29 % (ref 19.6–45.3)
MCH RBC QN AUTO: 31.2 PG (ref 27–32.7)
MCHC RBC AUTO-ENTMCNC: 32.7 G/DL (ref 32.6–36.4)
MCV RBC AUTO: 95.6 FL (ref 79.8–96.2)
MONOCYTES # BLD AUTO: 0.65 10*3/MM3 (ref 0.2–1.2)
MONOCYTES NFR BLD AUTO: 7.3 % (ref 5–12)
NEUTROPHILS # BLD AUTO: 5.4 10*3/MM3 (ref 1.9–8.1)
NEUTROPHILS NFR BLD AUTO: 60.9 % (ref 42.7–76)
NRBC BLD MANUAL-RTO: 0 /100 WBC (ref 0–0)
PLATELET # BLD AUTO: 239 10*3/MM3 (ref 140–500)
PMV BLD AUTO: 9.6 FL (ref 6–12)
RBC # BLD AUTO: 4.29 10*6/MM3 (ref 4.6–6)
WBC NRBC COR # BLD: 8.86 10*3/MM3 (ref 4.5–10.7)

## 2017-12-05 PROCEDURE — 99213 OFFICE O/P EST LOW 20 MIN: CPT | Performed by: INTERNAL MEDICINE

## 2017-12-05 PROCEDURE — 36415 COLL VENOUS BLD VENIPUNCTURE: CPT

## 2017-12-05 PROCEDURE — 85025 COMPLETE CBC W/AUTO DIFF WBC: CPT | Performed by: INTERNAL MEDICINE

## 2017-12-05 RX ORDER — TAMOXIFEN CITRATE 20 MG/1
TABLET ORAL DAILY
COMMUNITY
End: 2018-01-02 | Stop reason: SDUPTHER

## 2017-12-05 NOTE — PROGRESS NOTES
Subjective she describes no difficulty with tamoxifen    REASON FOR CONSULTATION:  Male breast cancer  Provide an opinion on any further workup or treatment                             REQUESTING PHYSICIAN:  Evnas Porter M.D., Marito Moser M.D.    RECORDS OBTAINED:  Records of the patients history including those obtained from the referring provider were reviewed and summarized in detail.    HISTORY OF PRESENT ILLNESS:  The patient is a 89 y.o. year old male who is here for an opinion about the above issue.    History of Present Illness       Patient is a 89-year-old male followed by primary care with hypertension, B12 deficiency and hyperlipidemia.  He had presented to his physician August 23 with development of gynecomastia in the right breast.  He had previous several lipomas and saw his dermatologist who felt this might be a similar issue but that it should be assessed and ultimately additional exams were performed.  These included mammogram and ultrasound August 29 demonstrating a localized mass in the retroareolar right breast and associated nipple retraction and thickening measuring up to 1.8 x 2 cm correlating with right breast ultrasound when voiding subareolar mass was also found measuring 1.1 x 1.6.  This led to biopsy September 12, 2017 which was consistent with an invasive mammary carcinoma, intermediate grade with cancer involving multiple fragments spanning at least 8 mm.Biomarker analysis included ER of 90% and NE 50%, HER-2 2+ leading to additional testing-HER-2/CEP 17 of 1.1 interpreted as negative.   The patient was seen by Dr. SAEED Moser of general surgery September 19 . it was decided to proceed with simple mastectomy and sentinel node biopsy.  This was performed on September 20.  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  The sentinel lymph node examined was negative with  his subsequent staging being PT1c PN 0 MX-stage I disease .  The patient is seen after surgery and has recovered relatively quickly with little additional pain medication.  Now presents for concerns for adjuvant therapy.    The patient was reviewed October 19 and is felt his prognosis was actually felt to be overall good though this could be improved by adjuvant tamoxifen.  He was placed on 20 mg daily anticipated with for potentially 5 years.  She is now seen back December for tolerating it well with no particular side effect.  Both he and his wife state that he is doing about the same though it is also apparent that he has not yet been vaccinated with flu season and have urged him to have this done.        Past Medical History:   Diagnosis Date   • Arthritis    • BPH (benign prostatic hyperplasia)    • Breast cancer     RIGHT   • Chronic low back pain    • Hard of hearing     LOKESH HEARING AIDS   • Melanoma     FROM BACK   • Prostate cancer     HISTORY RADIATION TREATMENT   • Seasonal allergies         Past Surgical History:   Procedure Laterality Date   • HERNIA REPAIR      LEFT AND RIGHT   • LAPAROSCOPIC CHOLECYSTECTOMY     • LUMBAR DECOMPRESSION     • MASTECTOMY Right 9/20/2017    Procedure: BREAST MASTECTOMY WITH SENTINEL NODE BIOPSY, RIGHT;  Surgeon: Marito Cazares MD;  Location: [facility] OR [place];  Service:    • SKIN CANCER EXCISION      SEVERAL MELANOMA REMOVED FROM BACK        Current Outpatient Prescriptions on File Prior to Visit   Medication Sig Dispense Refill   • Turmeric (CURCUMIN 95 PO) Take  by mouth. HELD FOR SURGERY       No current facility-administered medications on file prior to visit.         ALLERGIES:    Allergies   Allergen Reactions   • Penicillins Anaphylaxis        Social History     Social History   • Marital status:      Spouse name: Natali   • Number of children: N/A   • Years of education: College     Occupational History   • Research criminologist Retired     Social History Main  Topics   • Smoking status: Former Smoker     Years: 5.00     Types: Cigarettes   • Smokeless tobacco: Never Used      Comment: ONLY FOR COUPLE YEARS QUIT EARLY 50'S   • Alcohol use Yes      Comment: occasional   • Drug use: No   • Sexual activity: Not on file     Other Topics Concern   • Not on file     Social History Narrative        Family History   Problem Relation Age of Onset   • Prostate cancer Father    • Melanoma Father    • Heart disease Mother    • Malig Hyperthermia Neg Hx         Review of Systems   Gen.:, Weight loss noted after recent dietary change made as a result of needs of wife  HEENT: Bilateral deafness  10 additional systems negative except for periodic low back pain recognized    Objective     There were no vitals filed for this visit.  Current Status 10/19/2017   ECOG score 0       Physical Exam   Constitutional: He is oriented to person, place, and time. He appears well-developed and well-nourished. No distress.   HENT:   Head: Normocephalic and atraumatic.   Eyes: EOM are normal. Pupils are equal, round, and reactive to light.   Neck: Normal range of motion. Neck supple. No thyromegaly present.   Musculoskeletal: Normal range of motion.   Lymphadenopathy: no cervical, supraclavicular or axillary adenopathy.  Chest wall: Patient status post right simple mastectomy with healing suture line, no additional discharge or masses noted  Cardiovascular: Normal rate, regular rhythm, 2/6 systolic ejection murmur noted at apex   Neurological: He is alert and oriented to person, place, and time. No cranial nerve deficit. Coordination normal.         RECENT LABS:  Hematology WBC   Date Value Ref Range Status   10/19/2017 8.03 4.00 - 10.00 10*3/mm3 Final     RBC   Date Value Ref Range Status   10/19/2017 4.65 4.30 - 5.50 10*6/mm3 Final     Hemoglobin   Date Value Ref Range Status   10/19/2017 14.4 13.5 - 16.5 g/dL Final     Hematocrit   Date Value Ref Range Status   10/19/2017 43.3 40.0 - 49.0 % Final      Platelets   Date Value Ref Range Status   10/19/2017 172 150 - 375 10*3/mm3 Final          Assessment/Plan        89-year-old male followed with hypertension and previous surgeries including cholecystectomy and previous surgery for spinal stenosis who noted in the last several months development of unilateral gynecomastia on the right.  This was reviewed by physicians including his dermatologist to previously had assessed for lipomas but felt that this was a new abnormality.  His subsequent studies went on to reveal a mass localized in the retroareolar right breast with associated nipple retraction and thickening measuring up to 2 cm.  Subsequent biopsy was consistent with invasive mammary carcinoma of intermediate grade, ER positive at 90%, NE at 50% and HER-2 negative.  This was surgically addressed by Gen. surgery with simple mastectomy and sentinel node biopsy September 20.  The patient did extraordinarily well perioperatively has had no additional complications.  His subsequent pathology review reveals a stage I breast cancer- [id number].    The patient is seen for consultation October 19 and is reviewed with him the treatment of this particular disease-early stage breast cancer-is addressed the same in both men and women.  We reviewed his prognosis which is actually felt to be overall good plan that which could be improved by using adjuvant therapy and, in particular, the use of Tamoxifen.  He is not expected to have any serious complications to use this medication and is not on any additional medications or therapies that would interfere with its use.  He therefore agrees to a trial with plan to initiate 20 mg daily anticipating use for up to 5 years.  He is agreeable to this plan and will be seen back in the next 4-6 weeks coordinate his schedule with his wife was also seen in the practice.  Thank you very much for allowing me see this patient consultation and please let me know if any questions  concerning this case.    The patient seen back December 05, 2015 tolerating tamoxifen well and would plan to continue same.  He'll be seen back in 3 months and interval he'll have a flu vaccination dose high-dose.

## 2018-01-02 RX ORDER — TAMOXIFEN CITRATE 20 MG/1
20 TABLET ORAL DAILY
Qty: 90 TABLET | Refills: 3 | Status: SHIPPED | OUTPATIENT
Start: 2018-01-02 | End: 2018-01-03 | Stop reason: SDUPTHER

## 2018-01-03 RX ORDER — TAMOXIFEN CITRATE 20 MG/1
20 TABLET ORAL DAILY
Qty: 90 TABLET | Refills: 3 | Status: SHIPPED | OUTPATIENT
Start: 2018-01-03 | End: 2018-01-16 | Stop reason: SDUPTHER

## 2018-01-16 RX ORDER — TAMOXIFEN CITRATE 20 MG/1
20 TABLET ORAL DAILY
Qty: 90 TABLET | Refills: 3 | Status: SHIPPED | OUTPATIENT
Start: 2018-01-16 | End: 2019-01-21 | Stop reason: HOSPADM

## 2018-02-23 ENCOUNTER — DOCUMENTATION (OUTPATIENT)
Dept: OTHER | Facility: HOSPITAL | Age: 83
End: 2018-02-23

## 2018-02-23 NOTE — PROGRESS NOTES
Nicolas Wilde is a pleasant 89 y.o.   male seen today at the request of James Todd MD in our Cancer Survivorship Clinic, being seen for right breast invasive mammary carcinoma of intermediate grade, ER positive at 90%, AL at 50% and HER-2 negative, status post simple mastectomy. Here today for brief survivorship visit.    SURVIVORSHIP DISCUSSION HELD TODAY:   Primary patient goal(s): wellness     Management of disease and treatment related effects: Mr Wilde stops in today ahead of his scheduled survivorship visit. He reports doing well since his surgery. He has been tolerating the Tamoxifen well. We reviewed role of Tamoxifen for prevention in estrogen positive breast cancer. He does not have any barriers to adherence.       Psychosocial and spiritual: Mr Wilde cares for his wife full time at their home. They have a great granddaughter who is a medical assistant who lives with them in their basement apartment and he describes her as a great help in the evenings. Mr Wilde is connected to a group of friends in the community and he explains they rely on each other for support and fellowship and he is grateful they share a common Yarsanism zainab. His biggest concern  Is staying well enough to keep his wife at home and out of a nursing home. 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             Maintaining personal wellness: We discussed that he has not been able to maintain his weight, losing a total of 50 pounds since his wife became ill last year. He is trying to drink an Ensure every day. We discussed availability of oncology registered dietician, Cary Castorena and referral offered. Patient agrees at this time. Message sent to Cary to follow up with patient by phone to assess needs. Discussed NCCN recommendations for all cancer survivors of 150 minutes/week moderate intensity exercise, achieve and maintain a  healthy BMI, plants-based whole-foods diet, avoid tobacco and second hand smoke, moderate alcohol intake - no more than 1 drink per day for women, 2 drinks per day for men.    We did review his treatment course today. As he would prefer not to return to the clinic next week I will cancel his scheduled visit and I will complete his treatment summary and send by mail for his convienience. I have provided him with my card and encouraged him to call anytime with questions or for resources.

## 2018-03-06 ENCOUNTER — OFFICE VISIT (OUTPATIENT)
Dept: ONCOLOGY | Facility: CLINIC | Age: 83
End: 2018-03-06

## 2018-03-06 ENCOUNTER — LAB (OUTPATIENT)
Dept: OTHER | Facility: HOSPITAL | Age: 83
End: 2018-03-06

## 2018-03-06 VITALS
HEART RATE: 71 BPM | RESPIRATION RATE: 12 BRPM | TEMPERATURE: 97.7 F | SYSTOLIC BLOOD PRESSURE: 145 MMHG | OXYGEN SATURATION: 99 % | HEIGHT: 69 IN | DIASTOLIC BLOOD PRESSURE: 81 MMHG

## 2018-03-06 DIAGNOSIS — C50.121 MALIGNANT NEOPLASM OF CENTRAL PORTION OF RIGHT BREAST IN MALE, ESTROGEN RECEPTOR POSITIVE (HCC): ICD-10-CM

## 2018-03-06 DIAGNOSIS — C50.121 MALIGNANT NEOPLASM OF CENTRAL PORTION OF RIGHT BREAST IN MALE, ESTROGEN RECEPTOR POSITIVE (HCC): Primary | ICD-10-CM

## 2018-03-06 DIAGNOSIS — D51.8 OTHER VITAMIN B12 DEFICIENCY ANEMIA: Primary | ICD-10-CM

## 2018-03-06 DIAGNOSIS — Z17.0 MALIGNANT NEOPLASM OF CENTRAL PORTION OF RIGHT BREAST IN MALE, ESTROGEN RECEPTOR POSITIVE (HCC): Primary | ICD-10-CM

## 2018-03-06 DIAGNOSIS — Z17.0 MALIGNANT NEOPLASM OF CENTRAL PORTION OF RIGHT BREAST IN MALE, ESTROGEN RECEPTOR POSITIVE (HCC): ICD-10-CM

## 2018-03-06 LAB
BASOPHILS # BLD AUTO: 0.07 10*3/MM3 (ref 0–0.2)
BASOPHILS NFR BLD AUTO: 0.9 % (ref 0–1.5)
DEPRECATED RDW RBC AUTO: 49.1 FL (ref 37–54)
EOSINOPHIL # BLD AUTO: 0.12 10*3/MM3 (ref 0–0.7)
EOSINOPHIL NFR BLD AUTO: 1.5 % (ref 0.3–6.2)
ERYTHROCYTE [DISTWIDTH] IN BLOOD BY AUTOMATED COUNT: 14.2 % (ref 11.5–14.5)
HCT VFR BLD AUTO: 40.4 % (ref 40.4–52.2)
HGB BLD-MCNC: 13.4 G/DL (ref 13.7–17.6)
IMM GRANULOCYTES # BLD: 0.04 10*3/MM3 (ref 0–0.03)
IMM GRANULOCYTES NFR BLD: 0.5 % (ref 0–0.5)
LYMPHOCYTES # BLD AUTO: 1.91 10*3/MM3 (ref 0.9–4.8)
LYMPHOCYTES NFR BLD AUTO: 23.9 % (ref 19.6–45.3)
MCH RBC QN AUTO: 31.1 PG (ref 27–32.7)
MCHC RBC AUTO-ENTMCNC: 33.2 G/DL (ref 32.6–36.4)
MCV RBC AUTO: 93.7 FL (ref 79.8–96.2)
MONOCYTES # BLD AUTO: 0.76 10*3/MM3 (ref 0.2–1.2)
MONOCYTES NFR BLD AUTO: 9.5 % (ref 5–12)
NEUTROPHILS # BLD AUTO: 5.1 10*3/MM3 (ref 1.9–8.1)
NEUTROPHILS NFR BLD AUTO: 63.7 % (ref 42.7–76)
NRBC BLD MANUAL-RTO: 0 /100 WBC (ref 0–0)
PLAT MORPH BLD: NORMAL
PLATELET # BLD AUTO: 91 10*3/MM3 (ref 140–500)
PMV BLD AUTO: 11.1 FL (ref 6–12)
RBC # BLD AUTO: 4.31 10*6/MM3 (ref 4.6–6)
RBC MORPH BLD: NORMAL
WBC MORPH BLD: NORMAL
WBC NRBC COR # BLD: 8 10*3/MM3 (ref 4.5–10.7)

## 2018-03-06 PROCEDURE — 85025 COMPLETE CBC W/AUTO DIFF WBC: CPT | Performed by: INTERNAL MEDICINE

## 2018-03-06 PROCEDURE — 85007 BL SMEAR W/DIFF WBC COUNT: CPT | Performed by: INTERNAL MEDICINE

## 2018-03-06 PROCEDURE — 36415 COLL VENOUS BLD VENIPUNCTURE: CPT

## 2018-03-06 PROCEDURE — 99214 OFFICE O/P EST MOD 30 MIN: CPT | Performed by: INTERNAL MEDICINE

## 2018-03-06 NOTE — PROGRESS NOTES
Subjective   No difficulty with tamoxifen though weight loss as noted  REASON FOR CONSULTATION:  Male breast cancer  Provide an opinion on any further workup or treatment                             REQUESTING PHYSICIAN:  Evans Porter M.D., Marito Moser M.D.    RECORDS OBTAINED:  Records of the patients history including those obtained from the referring provider were reviewed and summarized in detail.    HISTORY OF PRESENT ILLNESS:  The patient is a 89 y.o. year old male who is here for an opinion about the above issue.    History of Present Illness       Patient is a 89-year-old male followed by primary care with hypertension, B12 deficiency and hyperlipidemia.  He had presented to his physician August 23 with development of gynecomastia in the right breast.  He had previous several lipomas and saw his dermatologist who felt this might be a similar issue but that it should be assessed and ultimately additional exams were performed.  These included mammogram and ultrasound August 29 demonstrating a localized mass in the retroareolar right breast and associated nipple retraction and thickening measuring up to 1.8 x 2 cm correlating with right breast ultrasound when voiding subareolar mass was also found measuring 1.1 x 1.6.  This led to biopsy September 12, 2017 which was consistent with an invasive mammary carcinoma, intermediate grade with cancer involving multiple fragments spanning at least 8 mm.Biomarker analysis included ER of 90% and IN 50%, HER-2 2+ leading to additional testing-HER-2/CEP 17 of 1.1 interpreted as negative.   The patient was seen by Dr. SAEED Moser of general surgery September 19 . it was decided to proceed with simple mastectomy and sentinel node biopsy.  This was performed on September 20.  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  The sentinel lymph node examined was  negative with his subsequent staging being PT1c PN 0 MX-stage I disease .  The patient is seen after surgery and has recovered relatively quickly with little additional pain medication.  Now presents for concerns for adjuvant therapy.    The patient was reviewed October 19 and is felt his prognosis was actually felt to be overall good though this could be improved by adjuvant tamoxifen.  He was placed on 20 mg daily anticipated with for potentially 5 years.  She is now seen back December for tolerating it well with no particular side effect.  Both he and his wife state that he is doing about the same though it is also apparent that he has not yet been vaccinated with flu season and have urged him to have this done.   Mr. Wilde is seen with his wife March 06, 2018.  In discussing both very issues with them he has lost nearly 10 pounds and possibly more in the last several months.  He indicates he is not having significant discomfort  but that his appetite is poor.      Past Medical History:   Diagnosis Date   • Arthritis    • BPH (benign prostatic hyperplasia)    • Breast cancer     RIGHT   • Chronic low back pain    • Hard of hearing     LOKESH HEARING AIDS   • Melanoma     FROM BACK   • Prostate cancer     HISTORY RADIATION TREATMENT   • Seasonal allergies         Past Surgical History:   Procedure Laterality Date   • HERNIA REPAIR      LEFT AND RIGHT   • LAPAROSCOPIC CHOLECYSTECTOMY     • LUMBAR DECOMPRESSION     • MASTECTOMY Right 9/20/2017    Procedure: BREAST MASTECTOMY WITH SENTINEL NODE BIOPSY, RIGHT;  Surgeon: Marito Cazares MD;  [place] [facility] [place] [facility] [place]  Service:    • SKIN CANCER EXCISION      SEVERAL MELANOMA REMOVED FROM BACK        Current Outpatient Prescriptions on File Prior to Visit   Medication Sig Dispense Refill   • tamoxifen (NOLVADEX) 20 MG chemo tablet Take 1 tablet by mouth Daily. 90 tablet 3   • Turmeric (CURCUMIN 95 PO) Take  by mouth. HELD FOR SURGERY       No current facility-administered  "medications on file prior to visit.         ALLERGIES:    Allergies   Allergen Reactions   • Penicillins Anaphylaxis        Social History     Social History   • Marital status:      Spouse name: Natali   • Years of education: College     Occupational History   • Research criminologist Retired     Social History Main Topics   • Smoking status: Former Smoker     Years: 5.00     Types: Cigarettes   • Smokeless tobacco: Never Used      Comment: ONLY FOR COUPLE YEARS QUIT EARLY 50'S   • Alcohol use Yes      Comment: occasional   • Drug use: No        Family History   Problem Relation Age of Onset   • Prostate cancer Father    • Melanoma Father    • Heart disease Mother    • Malig Hyperthermia Neg Hx         Review of Systems   Gen.:, Weight loss noted after recent dietary change made as a result of needs of wife though this appears to be worsening  HEENT: Bilateral deafness  10 additional systems negative except for periodic low back pain recognized    Objective     Vitals:    03/06/18 1342   BP: 145/81   Pulse: 71   Resp: 12   Temp: 97.7 °F (36.5 °C)   TempSrc: Oral   SpO2: 99%   Weight: Comment: pt stated he weighs 145lbs.   Height: 175 cm (68.9\")   PainSc: 0-No pain     Current Status 3/6/2018   ECOG score 0       Physical Exam   Constitutional: He is oriented to person, place, and time. He appears well-developed and well-nourished. No distress.   HENT:   Head: Normocephalic and atraumatic.   Eyes: EOM are normal. Pupils are equal, round, and reactive to light.   Neck: Normal range of motion. Neck supple. No thyromegaly present.   Musculoskeletal: Normal range of motion.   Lymphadenopathy: no cervical, supraclavicular adenopathy though there is a right axillary node-approximate 2 cm (deep axilla) is freely movable  Chest wall: Patient status post right simple mastectomy with healing suture line, no additional discharge or masses noted  Cardiovascular: Normal rate, regular rhythm, 2/6 systolic ejection murmur " noted at apex   Neurological: He is alert and oriented to person, place, and time. No cranial nerve deficit. Coordination normal.         RECENT LABS:  Hematology WBC   Date Value Ref Range Status   03/06/2018 8.00 4.50 - 10.70 10*3/mm3 Final     RBC   Date Value Ref Range Status   03/06/2018 4.31 (L) 4.60 - 6.00 10*6/mm3 Final     Hemoglobin   Date Value Ref Range Status   03/06/2018 13.4 (L) 13.7 - 17.6 g/dL Final     Hematocrit   Date Value Ref Range Status   03/06/2018 40.4 40.4 - 52.2 % Final     Platelets   Date Value Ref Range Status   03/06/2018 91 (L) 140 - 500 10*3/mm3 Final          Assessment/Plan        89-year-old male followed with hypertension and previous surgeries including cholecystectomy and previous surgery for spinal stenosis who noted in the last several months development of unilateral gynecomastia on the right.  This was reviewed by physicians including his dermatologist to previously had assessed for lipomas but felt that this was a new abnormality.  His subsequent studies went on to reveal a mass localized in the retroareolar right breast with associated nipple retraction and thickening measuring up to 2 cm.  Subsequent biopsy was consistent with invasive mammary carcinoma of intermediate grade, ER positive at 90%, SC at 50% and HER-2 negative.  This was surgically addressed by Gen. surgery with simple mastectomy and sentinel node biopsy September 20.  The patient did extraordinarily well perioperatively has had no additional complications.  His subsequent pathology review reveals a stage I breast cancer- [id number].    The patient is seen for consultation October 19 and is reviewed with him the treatment of this particular disease-early stage breast cancer-is addressed the same in both men and women.  We reviewed his prognosis which is actually felt to be overall good plan that which could be improved by using adjuvant therapy and, in particular, the use of Tamoxifen.  He is not expected  to have any serious complications to use this medication and is not on any additional medications or therapies that would interfere with its use.  He therefore agrees to a trial with plan to initiate 20 mg daily anticipating use for up to 5 years.  He is agreeable to this plan and will be seen back in the next 4-6 weeks coordinate his schedule with his wife was also seen in the practice.  Thank you very much for allowing me see this patient consultation and please let me know if any questions concerning this case.    The patient seen back December 05, 2015 tolerating tamoxifen well and would plan to continue same.  He'll be seen back in 3 months and interval he'll have a flu vaccination dose high-dose.    The patient is seen back March 06, 2018.  He has lost additional weight and also has an abnormality per his right axilla with adenopathy.  I discussed with him this could well be a recurrence.  Plan to assess with CT of chest, referral to Dr. SAEED Moser for assessment and biopsy and with the patient back here in approximately 3 weeks to discuss additional findings.

## 2018-03-07 ENCOUNTER — TELEPHONE (OUTPATIENT)
Dept: OTHER | Facility: HOSPITAL | Age: 83
End: 2018-03-07

## 2018-03-07 NOTE — TELEPHONE ENCOUNTER
Call placed to patient to verify her received survivorship care plan and accompanying materials. He let me know that unfortunately Dr Todd has discovered a mass of concern in the axilla and that he is in the process of having that worked up. I let him know that if I could be of assistance to him in any way to call and I would be happy to speak to him any time and I've encouraged him to take advantage of any of the resources available for himself or his wife. He verbalized understanding.

## 2018-03-13 ENCOUNTER — HOSPITAL ENCOUNTER (OUTPATIENT)
Dept: CT IMAGING | Facility: HOSPITAL | Age: 83
Discharge: HOME | End: 2018-03-13
Attending: INTERNAL MEDICINE | Admitting: INTERNAL MEDICINE

## 2018-03-13 DIAGNOSIS — Z17.0 MALIGNANT NEOPLASM OF CENTRAL PORTION OF RIGHT BREAST IN MALE, ESTROGEN RECEPTOR POSITIVE (HCC): ICD-10-CM

## 2018-03-13 DIAGNOSIS — C50.121 MALIGNANT NEOPLASM OF CENTRAL PORTION OF RIGHT BREAST IN MALE, ESTROGEN RECEPTOR POSITIVE (HCC): ICD-10-CM

## 2018-03-13 PROCEDURE — 71260 CT THORAX DX C+: CPT

## 2018-03-13 PROCEDURE — 0 IOPAMIDOL 61 % SOLUTION: Performed by: INTERNAL MEDICINE

## 2018-03-13 PROCEDURE — 82565 ASSAY OF CREATININE: CPT

## 2018-03-13 RX ADMIN — IOPAMIDOL 75 ML: 612 INJECTION, SOLUTION INTRAVENOUS at 13:20

## 2018-03-14 ENCOUNTER — PREP FOR SURGERY (OUTPATIENT)
Dept: OTHER | Facility: HOSPITAL | Age: 83
End: 2018-03-14

## 2018-03-14 DIAGNOSIS — R59.1 LYMPHADENOPATHY: Primary | ICD-10-CM

## 2018-03-14 DIAGNOSIS — Z85.3 HX OF BREAST CANCER: ICD-10-CM

## 2018-03-14 LAB — CREAT BLDA-MCNC: 0.8 MG/DL (ref 0.6–1.3)

## 2018-03-14 RX ORDER — CLINDAMYCIN PHOSPHATE 900 MG/50ML
900 INJECTION INTRAVENOUS ONCE
Status: CANCELLED | OUTPATIENT
Start: 2018-04-06 | End: 2018-04-06

## 2018-03-15 ENCOUNTER — TELEPHONE (OUTPATIENT)
Dept: ONCOLOGY | Facility: HOSPITAL | Age: 83
End: 2018-03-15

## 2018-03-28 PROBLEM — R59.1 LYMPHADENOPATHY: Status: ACTIVE | Noted: 2018-03-28

## 2018-03-28 PROBLEM — Z85.3 HX OF BREAST CANCER: Status: ACTIVE | Noted: 2018-03-28

## 2018-04-03 ENCOUNTER — OFFICE VISIT (OUTPATIENT)
Dept: ONCOLOGY | Facility: CLINIC | Age: 83
End: 2018-04-03

## 2018-04-03 ENCOUNTER — APPOINTMENT (OUTPATIENT)
Dept: OTHER | Facility: HOSPITAL | Age: 83
End: 2018-04-03

## 2018-04-03 VITALS
BODY MASS INDEX: 21.62 KG/M2 | DIASTOLIC BLOOD PRESSURE: 73 MMHG | HEIGHT: 69 IN | HEART RATE: 66 BPM | SYSTOLIC BLOOD PRESSURE: 156 MMHG | TEMPERATURE: 98.3 F | RESPIRATION RATE: 14 BRPM | OXYGEN SATURATION: 100 % | WEIGHT: 146 LBS

## 2018-04-03 DIAGNOSIS — Z17.0 MALIGNANT NEOPLASM OF CENTRAL PORTION OF RIGHT BREAST IN MALE, ESTROGEN RECEPTOR POSITIVE (HCC): Primary | ICD-10-CM

## 2018-04-03 DIAGNOSIS — D51.8 OTHER VITAMIN B12 DEFICIENCY ANEMIA: Primary | ICD-10-CM

## 2018-04-03 DIAGNOSIS — C50.121 MALIGNANT NEOPLASM OF CENTRAL PORTION OF RIGHT BREAST IN MALE, ESTROGEN RECEPTOR POSITIVE (HCC): Primary | ICD-10-CM

## 2018-04-03 LAB
BASOPHILS # BLD AUTO: 0.05 10*3/MM3 (ref 0–0.2)
BASOPHILS NFR BLD AUTO: 0.7 % (ref 0–1.5)
DEPRECATED RDW RBC AUTO: 50.6 FL (ref 37–54)
EOSINOPHIL # BLD AUTO: 0.06 10*3/MM3 (ref 0–0.7)
EOSINOPHIL NFR BLD AUTO: 0.8 % (ref 0.3–6.2)
ERYTHROCYTE [DISTWIDTH] IN BLOOD BY AUTOMATED COUNT: 14.2 % (ref 11.5–14.5)
HCT VFR BLD AUTO: 39 % (ref 40.4–52.2)
HGB BLD-MCNC: 12.5 G/DL (ref 13.7–17.6)
IMM GRANULOCYTES # BLD: 0.03 10*3/MM3 (ref 0–0.03)
IMM GRANULOCYTES NFR BLD: 0.4 % (ref 0–0.5)
LYMPHOCYTES # BLD AUTO: 1.63 10*3/MM3 (ref 0.9–4.8)
LYMPHOCYTES NFR BLD AUTO: 21.6 % (ref 19.6–45.3)
MCH RBC QN AUTO: 30.8 PG (ref 27–32.7)
MCHC RBC AUTO-ENTMCNC: 32.1 G/DL (ref 32.6–36.4)
MCV RBC AUTO: 96.1 FL (ref 79.8–96.2)
MONOCYTES # BLD AUTO: 0.6 10*3/MM3 (ref 0.2–1.2)
MONOCYTES NFR BLD AUTO: 8 % (ref 5–12)
NEUTROPHILS # BLD AUTO: 5.16 10*3/MM3 (ref 1.9–8.1)
NEUTROPHILS NFR BLD AUTO: 68.5 % (ref 42.7–76)
NRBC BLD MANUAL-RTO: 0 /100 WBC (ref 0–0)
PLATELET # BLD AUTO: 228 10*3/MM3 (ref 140–500)
PMV BLD AUTO: 9.7 FL (ref 6–12)
RBC # BLD AUTO: 4.06 10*6/MM3 (ref 4.6–6)
WBC NRBC COR # BLD: 7.53 10*3/MM3 (ref 4.5–10.7)

## 2018-04-03 PROCEDURE — 36415 COLL VENOUS BLD VENIPUNCTURE: CPT

## 2018-04-03 PROCEDURE — 99213 OFFICE O/P EST LOW 20 MIN: CPT | Performed by: INTERNAL MEDICINE

## 2018-04-03 PROCEDURE — 85025 COMPLETE CBC W/AUTO DIFF WBC: CPT | Performed by: INTERNAL MEDICINE

## 2018-04-03 NOTE — PROGRESS NOTES
Subjective   surgery planned this coming Friday for removal of right axillary node  REASON FOR CONSULTATION:  Male breast cancer  Provide an opinion on any further workup or treatment                             REQUESTING PHYSICIAN:  Evans Porter M.D., Marito Moser M.D.    History of Present Illness       Patient is a 89-year-old male followed by primary care with hypertension, B12 deficiency and hyperlipidemia.  He had presented to his physician August 23 with development of gynecomastia in the right breast.  He had previous several lipomas and saw his dermatologist who felt this might be a similar issue but that it should be assessed and ultimately additional exams were performed.  These included mammogram and ultrasound August 29 demonstrating a localized mass in the retroareolar right breast and associated nipple retraction and thickening measuring up to 1.8 x 2 cm correlating with right breast ultrasound when voiding subareolar mass was also found measuring 1.1 x 1.6.  This led to biopsy September 12, 2017 which was consistent with an invasive mammary carcinoma, intermediate grade with cancer involving multiple fragments spanning at least 8 mm.Biomarker analysis included ER of 90% and TN 50%, HER-2 2+ leading to additional testing-HER-2/CEP 17 of 1.1 interpreted as negative.   The patient was seen by Dr. SAEED Moser of general surgery September 19 . it was decided to proceed with simple mastectomy and sentinel node biopsy.  This was performed on September 20.  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  The sentinel lymph node examined was negative with his subsequent staging being PT1c PN 0 MX-stage I disease .  The patient is seen after surgery and has recovered relatively quickly with little additional pain medication.  Now presents for concerns for adjuvant therapy.    The patient was  reviewed October 19 and is felt his prognosis was actually felt to be overall good though this could be improved by adjuvant tamoxifen.  He was placed on 20 mg daily anticipated with for potentially 5 years.  She is now seen back December for tolerating it well with no particular side effect.  Both he and his wife state that he is doing about the same though it is also apparent that he has not yet been vaccinated with flu season and have urged him to have this done.   Mr. Wilde is seen with his wife March 06, 2018.  In discussing both very issues with them he has lost nearly 10 pounds and possibly more in the last several months.  He indicates he is not having significant discomfort  but that his appetite is poor.      With the patient's assessment March 6 he was found to have right axillary adenopathy.  This appeared to be a single node and a CT scan the chest was obtained revealing a 2.6 x 2.2 cm enlarged lymph node in the right axilla that was new from previous scanning.  There was not pathologically enlarged right axillary lymph nodes, no mediastinal, hilar or left axillary lymphadenopathy nor evidence of disease involving the right mastectomy site.  Lung windows showed no pulmonary nodules except an 8 mm nodule in the right lower lobe that was stable.  No other abnormalities were seen.  The patient was referred to Dr. SAEED Moser for surgical resection and when he is seen April 3 is scheduled for the procedure on April 6.    Past Medical History:   Diagnosis Date   • Arthritis    • BPH (benign prostatic hyperplasia)    • Breast cancer     RIGHT   • Chronic low back pain    • Hard of hearing     LOKESH HEARING AIDS   • Melanoma     FROM BACK   • Prostate cancer     HISTORY RADIATION TREATMENT   • Seasonal allergies         Past Surgical History:   Procedure Laterality Date   • HERNIA REPAIR      LEFT AND RIGHT   • LAPAROSCOPIC CHOLECYSTECTOMY     • LUMBAR DECOMPRESSION     • MASTECTOMY Right 9/20/2017    Procedure:  "BREAST MASTECTOMY WITH SENTINEL NODE BIOPSY, RIGHT;  Surgeon: Marito Cazares MD;  Location: [facility] OR [place];  Service:    • SKIN CANCER EXCISION      SEVERAL MELANOMA REMOVED FROM BACK        Current Outpatient Prescriptions on File Prior to Visit   Medication Sig Dispense Refill   • Probiotic Product (PROBIOTIC PO) Take  by mouth.     • tamoxifen (NOLVADEX) 20 MG chemo tablet Take 1 tablet by mouth Daily. 90 tablet 3   • Turmeric (CURCUMIN 95 PO) Take  by mouth. HELD FOR SURGERY       No current facility-administered medications on file prior to visit.         ALLERGIES:    Allergies   Allergen Reactions   • Penicillins Anaphylaxis        Social History     Social History   • Marital status:      Spouse name: Natali   • Years of education: College     Occupational History   • Research criminologist Retired     Social History Main Topics   • Smoking status: Former Smoker     Years: 5.00     Types: Cigarettes   • Smokeless tobacco: Never Used      Comment: ONLY FOR COUPLE YEARS QUIT EARLY 50'S   • Alcohol use Yes      Comment: occasional   • Drug use: No     Other Topics Concern   • Not on file        Family History   Problem Relation Age of Onset   • Prostate cancer Father    • Melanoma Father    • Heart disease Mother    • Malig Hyperthermia Neg Hx         Review of Systems   Gen.:, Weight loss noted after recent dietary change made as a result of needs of wife though this appears to be worsening  HEENT: Bilateral deafness  10 additional systems negative except for periodic low back pain recognized    Objective     Vitals:    04/03/18 1251   BP: 156/73   Pulse: 66   Resp: 14   Temp: 98.3 °F (36.8 °C)   TempSrc: Oral   SpO2: 100%   Weight: 66.2 kg (146 lb)  Comment: stated   Height: 175 cm (68.9\")   PainSc: 0-No pain     Current Status 4/3/2018   ECOG score 0       Physical Exam   Constitutional: He is oriented to person, place, and time. He appears well-developed and well-nourished. No distress.   HENT: "   Head: Normocephalic and atraumatic.   Eyes: EOM are normal. Pupils are equal, round, and reactive to light.   Neck: Normal range of motion. Neck supple. No thyromegaly present.   Musculoskeletal: Normal range of motion.   Lymphadenopathy: no cervical, supraclavicular adenopathy though there is a right axillary node-approximate 2 cm (deep axilla) is freely movable  Chest wall: Patient status post right simple mastectomy with healing suture line, no additional discharge or masses noted  Cardiovascular: Normal rate, regular rhythm, 2/6 systolic ejection murmur noted at apex   Neurological: He is alert and oriented to person, place, and time. No cranial nerve deficit. Coordination normal.         RECENT LABS:  Hematology WBC   Date Value Ref Range Status   04/03/2018 7.53 4.50 - 10.70 10*3/mm3 Final     RBC   Date Value Ref Range Status   04/03/2018 4.06 (L) 4.60 - 6.00 10*6/mm3 Final     Hemoglobin   Date Value Ref Range Status   04/03/2018 12.5 (L) 13.7 - 17.6 g/dL Final     Hematocrit   Date Value Ref Range Status   04/03/2018 39.0 (L) 40.4 - 52.2 % Final     Platelets   Date Value Ref Range Status   04/03/2018 228 140 - 500 10*3/mm3 Final      CT CHEST W CONTRAST- March 13, 2018  FINDINGS: There is a 2.6 x 2.2 cm diameter enlarged lymph node in the  right axilla that is new since the preceding CT scan chest. A few  additional shotty nonpathologically enlarged right axillary lymph nodes  are also noted. There is no mediastinal or hilar or left axillary  lymphadenopathy. There is no evidence of recurrent neoplasm at the site  of the right mastectomy. Lung window images demonstrate an 8 mm diameter  noncalcified pulmonary nodule in the anterior aspect of the right lower  lobe that is stable. No other pulmonary nodules are identified. There  are no parenchymal infiltrates or pleural effusions. Images through the  upper abdomen show no significant abnormality. A tiny pericardial  effusion is noted.      IMPRESSION:  Solitary mildly enlarged right axillary lymph node  suspicious for localized metastatic disease in this patient with history  of breast carcinoma on the right. A noncalcified pulmonary nodule in the  right lower lobe has been stable since CT imaging dating back to  10/16/2014, and is therefore benign. There is no evidence of recurrent  neoplasm at the mastectomy site.    Assessment/Plan        89-year-old male followed with hypertension and previous surgeries including cholecystectomy and previous surgery for spinal stenosis who noted in the last several months development of unilateral gynecomastia on the right.  This was reviewed by physicians including his dermatologist to previously had assessed for lipomas but felt that this was a new abnormality.  His subsequent studies went on to reveal a mass localized in the retroareolar right breast with associated nipple retraction and thickening measuring up to 2 cm.  Subsequent biopsy was consistent with invasive mammary carcinoma of intermediate grade, ER positive at 90%, NC at 50% and HER-2 negative.  This was surgically addressed by Gen. surgery with simple mastectomy and sentinel node biopsy September 20.  The patient did extraordinarily well perioperatively has had no additional complications.  His subsequent pathology review reveals a stage I breast cancer- [id number].    The patient is seen for consultation October 19 and is reviewed with him the treatment of this particular disease-early stage breast cancer-is addressed the same in both men and women.  We reviewed his prognosis which is actually felt to be overall good plan that which could be improved by using adjuvant therapy and, in particular, the use of Tamoxifen.  He is not expected to have any serious complications to use this medication and is not on any additional medications or therapies that would interfere with its use.  He therefore agrees to a trial with plan to initiate 20 mg daily  anticipating use for up to 5 years.  He is agreeable to this plan and will be seen back in the next 4-6 weeks coordinate his schedule with his wife was also seen in the practice.  Thank you very much for allowing me see this patient consultation and please let me know if any questions concerning this case.    The patient seen back December 05, 2015 tolerating tamoxifen well and would plan to continue same.  He'll be seen back in 3 months and interval he'll have a flu vaccination dose high-dose.    The patient is seen back March 06, 2018.  He has lost additional weight and also has an abnormality per his right axilla with adenopathy.  I discussed with him this could well be a recurrence.  We went on to have him assessed via CT scan of the chest as above and he was referred to surgery for removal which is now scheduled for this coming Friday.  At this point plan:  1.  Hold Tamoxifen at present until postop-likely the following Monday  2.  Surgery as scheduled this coming Friday requesting not only pathology review but repeat biomarkers, ER/WA HER-2 horacio.  3.  Follow-up assessment in approximately 3-4 weeks

## 2018-04-06 ENCOUNTER — ANESTHESIA (OUTPATIENT)
Dept: PERIOP | Facility: HOSPITAL | Age: 83
End: 2018-04-06

## 2018-04-06 ENCOUNTER — ANESTHESIA EVENT (OUTPATIENT)
Dept: PERIOP | Facility: HOSPITAL | Age: 83
End: 2018-04-06

## 2018-04-06 ENCOUNTER — HOSPITAL ENCOUNTER (OUTPATIENT)
Facility: HOSPITAL | Age: 83
Setting detail: HOSPITAL OUTPATIENT SURGERY
Discharge: HOME | End: 2018-04-06
Attending: SURGERY | Admitting: SURGERY

## 2018-04-06 VITALS
DIASTOLIC BLOOD PRESSURE: 87 MMHG | TEMPERATURE: 98.4 F | HEART RATE: 65 BPM | RESPIRATION RATE: 16 BRPM | SYSTOLIC BLOOD PRESSURE: 155 MMHG | OXYGEN SATURATION: 98 %

## 2018-04-06 DIAGNOSIS — R59.1 LYMPHADENOPATHY: ICD-10-CM

## 2018-04-06 DIAGNOSIS — Z85.3 HX OF BREAST CANCER: ICD-10-CM

## 2018-04-06 PROCEDURE — 25010000002 PROPOFOL 10 MG/ML EMULSION: Performed by: NURSE ANESTHETIST, CERTIFIED REGISTERED

## 2018-04-06 PROCEDURE — 25010000002 FENTANYL CITRATE (PF) 100 MCG/2ML SOLUTION: Performed by: NURSE ANESTHETIST, CERTIFIED REGISTERED

## 2018-04-06 PROCEDURE — 88305 TISSUE EXAM BY PATHOLOGIST: CPT | Performed by: SURGERY

## 2018-04-06 PROCEDURE — 25010000002 ONDANSETRON PER 1 MG: Performed by: NURSE ANESTHETIST, CERTIFIED REGISTERED

## 2018-04-06 RX ORDER — NALOXONE HCL 0.4 MG/ML
0.4 VIAL (ML) INJECTION AS NEEDED
Status: DISCONTINUED | OUTPATIENT
Start: 2018-04-06 | End: 2018-04-06 | Stop reason: HOSPADM

## 2018-04-06 RX ORDER — ONDANSETRON 2 MG/ML
4 INJECTION INTRAMUSCULAR; INTRAVENOUS ONCE AS NEEDED
Status: DISCONTINUED | OUTPATIENT
Start: 2018-04-06 | End: 2018-04-06 | Stop reason: HOSPADM

## 2018-04-06 RX ORDER — FAMOTIDINE 10 MG/ML
20 INJECTION, SOLUTION INTRAVENOUS ONCE
Status: COMPLETED | OUTPATIENT
Start: 2018-04-06 | End: 2018-04-06

## 2018-04-06 RX ORDER — DIPHENHYDRAMINE HYDROCHLORIDE 50 MG/ML
12.5 INJECTION INTRAMUSCULAR; INTRAVENOUS
Status: DISCONTINUED | OUTPATIENT
Start: 2018-04-06 | End: 2018-04-06 | Stop reason: HOSPADM

## 2018-04-06 RX ORDER — LABETALOL HYDROCHLORIDE 5 MG/ML
5 INJECTION, SOLUTION INTRAVENOUS
Status: DISCONTINUED | OUTPATIENT
Start: 2018-04-06 | End: 2018-04-06 | Stop reason: HOSPADM

## 2018-04-06 RX ORDER — EPHEDRINE SULFATE 50 MG/ML
5 INJECTION, SOLUTION INTRAVENOUS ONCE AS NEEDED
Status: DISCONTINUED | OUTPATIENT
Start: 2018-04-06 | End: 2018-04-06 | Stop reason: HOSPADM

## 2018-04-06 RX ORDER — PROPOFOL 10 MG/ML
VIAL (ML) INTRAVENOUS AS NEEDED
Status: DISCONTINUED | OUTPATIENT
Start: 2018-04-06 | End: 2018-04-06 | Stop reason: SURG

## 2018-04-06 RX ORDER — FENTANYL CITRATE 50 UG/ML
INJECTION, SOLUTION INTRAMUSCULAR; INTRAVENOUS AS NEEDED
Status: DISCONTINUED | OUTPATIENT
Start: 2018-04-06 | End: 2018-04-06 | Stop reason: SURG

## 2018-04-06 RX ORDER — HYDRALAZINE HYDROCHLORIDE 20 MG/ML
5 INJECTION INTRAMUSCULAR; INTRAVENOUS
Status: DISCONTINUED | OUTPATIENT
Start: 2018-04-06 | End: 2018-04-06 | Stop reason: HOSPADM

## 2018-04-06 RX ORDER — HYDROCODONE BITARTRATE AND ACETAMINOPHEN 7.5; 325 MG/1; MG/1
1 TABLET ORAL ONCE AS NEEDED
Status: DISCONTINUED | OUTPATIENT
Start: 2018-04-06 | End: 2018-04-06 | Stop reason: HOSPADM

## 2018-04-06 RX ORDER — DIPHENHYDRAMINE HYDROCHLORIDE 50 MG/ML
6.25 INJECTION INTRAMUSCULAR; INTRAVENOUS
Status: DISCONTINUED | OUTPATIENT
Start: 2018-04-06 | End: 2018-04-06 | Stop reason: HOSPADM

## 2018-04-06 RX ORDER — CLINDAMYCIN PHOSPHATE 900 MG/50ML
900 INJECTION INTRAVENOUS ONCE
Status: COMPLETED | OUTPATIENT
Start: 2018-04-06 | End: 2018-04-06

## 2018-04-06 RX ORDER — MIDAZOLAM HYDROCHLORIDE 1 MG/ML
0.5 INJECTION INTRAMUSCULAR; INTRAVENOUS
Status: DISCONTINUED | OUTPATIENT
Start: 2018-04-06 | End: 2018-04-06 | Stop reason: HOSPADM

## 2018-04-06 RX ORDER — LIDOCAINE HYDROCHLORIDE 20 MG/ML
INJECTION, SOLUTION INFILTRATION; PERINEURAL AS NEEDED
Status: DISCONTINUED | OUTPATIENT
Start: 2018-04-06 | End: 2018-04-06 | Stop reason: SURG

## 2018-04-06 RX ORDER — MAGNESIUM HYDROXIDE 1200 MG/15ML
LIQUID ORAL AS NEEDED
Status: DISCONTINUED | OUTPATIENT
Start: 2018-04-06 | End: 2018-04-06 | Stop reason: HOSPADM

## 2018-04-06 RX ORDER — OXYCODONE AND ACETAMINOPHEN 7.5; 325 MG/1; MG/1
1 TABLET ORAL ONCE AS NEEDED
Status: DISCONTINUED | OUTPATIENT
Start: 2018-04-06 | End: 2018-04-06 | Stop reason: HOSPADM

## 2018-04-06 RX ORDER — SODIUM CHLORIDE 0.9 % (FLUSH) 0.9 %
1-10 SYRINGE (ML) INJECTION AS NEEDED
Status: DISCONTINUED | OUTPATIENT
Start: 2018-04-06 | End: 2018-04-06 | Stop reason: HOSPADM

## 2018-04-06 RX ORDER — ONDANSETRON 2 MG/ML
INJECTION INTRAMUSCULAR; INTRAVENOUS AS NEEDED
Status: DISCONTINUED | OUTPATIENT
Start: 2018-04-06 | End: 2018-04-06 | Stop reason: SURG

## 2018-04-06 RX ORDER — SODIUM CHLORIDE, SODIUM LACTATE, POTASSIUM CHLORIDE, CALCIUM CHLORIDE 600; 310; 30; 20 MG/100ML; MG/100ML; MG/100ML; MG/100ML
9 INJECTION, SOLUTION INTRAVENOUS CONTINUOUS
Status: DISCONTINUED | OUTPATIENT
Start: 2018-04-06 | End: 2018-04-06 | Stop reason: HOSPADM

## 2018-04-06 RX ORDER — HYDROCODONE BITARTRATE AND ACETAMINOPHEN 5; 325 MG/1; MG/1
1 TABLET ORAL ONCE AS NEEDED
Status: DISCONTINUED | OUTPATIENT
Start: 2018-04-06 | End: 2018-04-06 | Stop reason: HOSPADM

## 2018-04-06 RX ORDER — HYDROCODONE BITARTRATE AND ACETAMINOPHEN 5; 325 MG/1; MG/1
1-2 TABLET ORAL EVERY 4 HOURS PRN
Qty: 20 TABLET | Refills: 0 | Status: ON HOLD | OUTPATIENT
Start: 2018-04-06 | End: 2019-01-12

## 2018-04-06 RX ORDER — FENTANYL CITRATE 50 UG/ML
25 INJECTION, SOLUTION INTRAMUSCULAR; INTRAVENOUS
Status: DISCONTINUED | OUTPATIENT
Start: 2018-04-06 | End: 2018-04-06 | Stop reason: HOSPADM

## 2018-04-06 RX ORDER — FENTANYL CITRATE 50 UG/ML
50 INJECTION, SOLUTION INTRAMUSCULAR; INTRAVENOUS
Status: DISCONTINUED | OUTPATIENT
Start: 2018-04-06 | End: 2018-04-06 | Stop reason: HOSPADM

## 2018-04-06 RX ADMIN — CLINDAMYCIN PHOSPHATE 900 MG: 900 INJECTION INTRAVENOUS at 13:11

## 2018-04-06 RX ADMIN — LIDOCAINE HYDROCHLORIDE 80 MG: 20 INJECTION, SOLUTION INFILTRATION; PERINEURAL at 13:08

## 2018-04-06 RX ADMIN — FAMOTIDINE 20 MG: 10 INJECTION INTRAVENOUS at 11:55

## 2018-04-06 RX ADMIN — SODIUM CHLORIDE, POTASSIUM CHLORIDE, SODIUM LACTATE AND CALCIUM CHLORIDE 9 ML/HR: 600; 310; 30; 20 INJECTION, SOLUTION INTRAVENOUS at 11:50

## 2018-04-06 RX ADMIN — ONDANSETRON 4 MG: 2 INJECTION INTRAMUSCULAR; INTRAVENOUS at 13:23

## 2018-04-06 RX ADMIN — PROPOFOL 100 MG: 10 INJECTION, EMULSION INTRAVENOUS at 13:08

## 2018-04-06 RX ADMIN — FENTANYL CITRATE 50 MCG: 50 INJECTION INTRAMUSCULAR; INTRAVENOUS at 13:08

## 2018-04-06 NOTE — ANESTHESIA POSTPROCEDURE EVALUATION
Patient: Nicolas Wilde    Procedure Summary     Date:  04/06/18 Room / Location:   [facility]  [facility]    Anesthesia Start:  1300 Anesthesia Stop:  1348    Procedure:  AXILLARY LYMPH NODE BIOPSY/EXCISION, RIGHT (Right Axilla) Diagnosis:       Lymphadenopathy      Hx of breast cancer      (Lymphadenopathy [R59.1])      (Hx of breast cancer [Z85.3])    Surgeon:  Marito Cazares MD Provider:  Isaac Georges MD    Anesthesia Type:  MAC, general ASA Status:  3          Anesthesia Type: MAC, general  Last vitals  BP   162/94 (04/06/18 1430)   Temp   36.2 °C (97.2 °F) (04/06/18 1343)   Pulse   63 (04/06/18 1430)   Resp   16 (04/06/18 1430)     SpO2   100 % (04/06/18 1430)     Post Anesthesia Care and Evaluation    Patient location during evaluation: bedside  Patient participation: complete - patient participated  Level of consciousness: awake  Pain score: 2  Pain management: adequate  Airway patency: patent  Anesthetic complications: No anesthetic complications    Cardiovascular status: acceptable  Respiratory status: acceptable  Hydration status: acceptable    Comments: /94   Pulse 63   Temp 36.2 °C (97.2 °F) (Temporal Artery )   Resp 16   SpO2 100%

## 2018-04-06 NOTE — ANESTHESIA PREPROCEDURE EVALUATION
Anesthesia Evaluation     no history of anesthetic complications:               Airway   Mallampati: I  no difficulty expected  Dental - normal exam     Pulmonary - normal exam   (+) a smoker Former,   (-) COPD, asthma, sleep apnea    PE comment: nonlabored  Cardiovascular - normal exam    Rhythm: regular  Rate: normal    (+) hypertension well controlled,   (-) valvular problems/murmurs, past MI, CAD, dysrhythmias, angina      Neuro/Psych- negative ROS  (-) seizures, TIA, CVA  GI/Hepatic/Renal/Endo - negative ROS   (-) GERD, liver disease, diabetes, hypothyroidism, hyperthyroidism    Musculoskeletal     (+) arthralgias,   Abdominal    Substance History      OB/GYN          Other   (+) arthritis   history of cancer (Prostate, Breast, Skin Helene)      Other Comment: R Axillary Lymphadenopathy                  Anesthesia Plan    ASA 3     MAC and general   (Scheduled as GA but pt says he thought he was going to be more awake. Will discuss with Dr. Cardona but pt consents for either GA or MAC as indicated.)  intravenous induction   Anesthetic plan and risks discussed with patient.

## 2018-04-06 NOTE — ANESTHESIA PROCEDURE NOTES
Airway  Urgency: elective    Airway not difficult    General Information and Staff    Patient location during procedure: OR  CRNA: CINDI ROSENBERG    Indications and Patient Condition  Indications for airway management: airway protection    Preoxygenated: yes  Mask difficulty assessment: 1 - vent by mask    Final Airway Details  Final airway type: supraglottic airway      Successful airway: classic  Size 5    Number of attempts at approach: 1    Additional Comments  LMA placed easily.  Cuff MOP.

## 2018-04-06 NOTE — OP NOTE
April 6, 2018    Nicolas Wilde  [id number]    PROCEDURE: Excision deep right axillary mass with layered closure.    PREOPERATIVE DIAGNOSIS:  Lymphadenopathy [R59.1]  Hx of breast cancer [Z85.3].    POSTOPERATIVE DIAGNOSIS: Same.    SURGEON:  Marito Cazares M.D.    ASSISTANT:  None.    ANESTHESIA:  Gen. with local.    ESTIMATED BLOOD LOSS: minimal    SPECIMENS:    Order Name Source Comment Collection Info Order Time   TISSUE PATHOLOGY EXAM Axilla, Right  Collected By: Marito Cazares MD 4/6/2018  1:28 PM       INDICATIONS:  Patient is a 89-year-old young man who has undergone breast conserving therapy on the right for invasive adenocarcinoma of the right breast.  [place] node biopsy at that time was negative but unfortunately he is developed a visible and palpable right axillary Mass.  CT scan done last month did show evidence of lymphadenopathy with a single solitary worrisome lymph node.  He presents now for excision of right axillary mass.    PROCEDURE: Patient was brought to the operating room and placed supine on the table.  General anesthesia is administered.  The right axilla was identified confirmed and the mass is easily visible and palpable.  Standard prep and drape was performed.  Local anesthetic field block is injected.  Old scar from previous axillary sentinel node biopsy is opened up with cautery.  The mass was easily palpable and visible but when I grabbed it with an Allis clamp and elevated in the operative field it did not have the appearance of a typical lymph node.  I dissected around it sharply with cautery and actually got into it and identified clear fluid consistent with a likely chronic postoperative seroma.  I excised it completely with cautery and removed it and sent it for pathologic evaluation.  I then did extensive and deep bimanual examination through the incision of the axilla and found no other suspicious abnormalities, certainly none that were upwards of 2.5 cm S described on  the CT scan.  Given his age, I elected not to pursue any deep or high axillary lymph node dissection.  Instead, I irrigated the axilla with local anesthetic solution and initiated closure.  The subcutaneous fascia was closed with a running 3-0 Vicryl suture and the skin was closed with running 4-0 Vicryl suture.  Benzoin Steri-Strips gauze and Tegaderm were applied.  He'll be sent home today.      Marito Cazares M.D.

## 2018-04-06 NOTE — H&P
H&P    BUFFY MARSH  YOB: 1926  DATE OF SERVICE:  03/12/2018        The patient presents for follow-up visit.  He is about six months out from mastectomy and sentinel node biopsy on the right for malignancy.  Unfortunately, his oncologist palpated a node in the right axilla and sent him to me.  He denies any pain or upper extremity neurovascular complaints.  He denies any trauma to the area.  He has had no recent infections in the area.PAST MEDICAL HISTORY:  Reviewed and unchanged.    SOCIAL HISTORY:  Reviewed and unchanged.  FAMILY HISTORY:  Reviewed and unchanged.    PHYSICAL EXAMINATION:  GENERAL:  A healthy appearing for his age gentleman, in no acute distress, alert, oriented, and afebrile.  HEENT:  Head normocephalic.  External ears normal.  Sclerae nonicteric.  NECK:  Supple without thyromegaly.  CHEST:  Clear with good respiratory effort bilaterally.  HEART:  Regular without murmur.  ABDOMEN:  Soft without masses or hernias.  EXTREMITIES:  Evidence of a 1.2 cm soft, round mass in the mid upper right axilla without any surrounding worrisome abnormalities otherwise.  Left side negative.    IMPRESSION:  Right axillary mass, possible lymph node, less likely seroma or lymphocele.    PLAN:  The patient has a CT scan scheduled for tomorrow, so I think before we worry about removal I would like to get that test result and hopefully it shows there is something benign such as a seroma or lymphocele and then we can keep an eye on it given his age.  Plan was discussed.  He is agreeable.  We will proceed.    Letter to James Todd MD.          VIDA Gregory: stephanie/madeleine  03/12/2018 00:00:00

## 2018-04-09 LAB
CYTO UR: NORMAL
LAB AP CASE REPORT: NORMAL
LAB AP INTRADEPARTMENTAL CONSULT: NORMAL
Lab: NORMAL
PATH REPORT.FINAL DX SPEC: NORMAL
PATH REPORT.GROSS SPEC: NORMAL

## 2018-05-01 ENCOUNTER — APPOINTMENT (OUTPATIENT)
Dept: OTHER | Facility: HOSPITAL | Age: 83
End: 2018-05-01

## 2018-05-01 ENCOUNTER — OFFICE VISIT (OUTPATIENT)
Dept: ONCOLOGY | Facility: CLINIC | Age: 83
End: 2018-05-01

## 2018-05-01 VITALS
HEART RATE: 66 BPM | RESPIRATION RATE: 16 BRPM | SYSTOLIC BLOOD PRESSURE: 170 MMHG | DIASTOLIC BLOOD PRESSURE: 92 MMHG | BODY MASS INDEX: 21.48 KG/M2 | OXYGEN SATURATION: 99 % | TEMPERATURE: 97.5 F | WEIGHT: 145 LBS | HEIGHT: 69 IN

## 2018-05-01 DIAGNOSIS — D51.8 OTHER VITAMIN B12 DEFICIENCY ANEMIA: Primary | ICD-10-CM

## 2018-05-01 DIAGNOSIS — Z17.0 MALIGNANT NEOPLASM OF CENTRAL PORTION OF RIGHT BREAST IN MALE, ESTROGEN RECEPTOR POSITIVE (HCC): Primary | ICD-10-CM

## 2018-05-01 DIAGNOSIS — Z85.3 HX OF BREAST CANCER: ICD-10-CM

## 2018-05-01 DIAGNOSIS — C50.121 MALIGNANT NEOPLASM OF CENTRAL PORTION OF RIGHT BREAST IN MALE, ESTROGEN RECEPTOR POSITIVE (HCC): Primary | ICD-10-CM

## 2018-05-01 LAB
BASOPHILS # BLD AUTO: 0.05 10*3/MM3 (ref 0–0.2)
BASOPHILS NFR BLD AUTO: 0.6 % (ref 0–1.5)
DEPRECATED RDW RBC AUTO: 50.5 FL (ref 37–54)
EOSINOPHIL # BLD AUTO: 0.12 10*3/MM3 (ref 0–0.7)
EOSINOPHIL NFR BLD AUTO: 1.5 % (ref 0.3–6.2)
ERYTHROCYTE [DISTWIDTH] IN BLOOD BY AUTOMATED COUNT: 14.3 % (ref 11.5–14.5)
HCT VFR BLD AUTO: 40.4 % (ref 40.4–52.2)
HGB BLD-MCNC: 13.1 G/DL (ref 13.7–17.6)
IMM GRANULOCYTES # BLD: 0.03 10*3/MM3 (ref 0–0.03)
IMM GRANULOCYTES NFR BLD: 0.4 % (ref 0–0.5)
LYMPHOCYTES # BLD AUTO: 2.18 10*3/MM3 (ref 0.9–4.8)
LYMPHOCYTES NFR BLD AUTO: 28.1 % (ref 19.6–45.3)
MCH RBC QN AUTO: 31.1 PG (ref 27–32.7)
MCHC RBC AUTO-ENTMCNC: 32.4 G/DL (ref 32.6–36.4)
MCV RBC AUTO: 96 FL (ref 79.8–96.2)
MONOCYTES # BLD AUTO: 0.66 10*3/MM3 (ref 0.2–1.2)
MONOCYTES NFR BLD AUTO: 8.5 % (ref 5–12)
NEUTROPHILS # BLD AUTO: 4.73 10*3/MM3 (ref 1.9–8.1)
NEUTROPHILS NFR BLD AUTO: 60.9 % (ref 42.7–76)
NRBC BLD MANUAL-RTO: 0 /100 WBC (ref 0–0)
PLATELET # BLD AUTO: 245 10*3/MM3 (ref 140–500)
PMV BLD AUTO: 9.6 FL (ref 6–12)
RBC # BLD AUTO: 4.21 10*6/MM3 (ref 4.6–6)
WBC NRBC COR # BLD: 7.77 10*3/MM3 (ref 4.5–10.7)

## 2018-05-01 PROCEDURE — 99213 OFFICE O/P EST LOW 20 MIN: CPT | Performed by: INTERNAL MEDICINE

## 2018-05-01 PROCEDURE — 85025 COMPLETE CBC W/AUTO DIFF WBC: CPT | Performed by: INTERNAL MEDICINE

## 2018-05-01 PROCEDURE — 36415 COLL VENOUS BLD VENIPUNCTURE: CPT

## 2018-05-01 NOTE — PROGRESS NOTES
Subjective   surgery planned this coming Friday for removal of right axillary node  REASON FOR CONSULTATION:  Male breast cancer  Provide an opinion on any further workup or treatment                             REQUESTING PHYSICIAN:  Evans Porter M.D., Marito Moser M.D.    History of Present Illness       Patient is a 89-year-old male followed by primary care with hypertension, B12 deficiency and hyperlipidemia.  He had presented to his physician August 23 with development of gynecomastia in the right breast.  He had previous several lipomas and saw his dermatologist who felt this might be a similar issue but that it should be assessed and ultimately additional exams were performed.  These included mammogram and ultrasound August 29 demonstrating a localized mass in the retroareolar right breast and associated nipple retraction and thickening measuring up to 1.8 x 2 cm correlating with right breast ultrasound when voiding subareolar mass was also found measuring 1.1 x 1.6.  This led to biopsy September 12, 2017 which was consistent with an invasive mammary carcinoma, intermediate grade with cancer involving multiple fragments spanning at least 8 mm.Biomarker analysis included ER of 90% and AR 50%, HER-2 2+ leading to additional testing-HER-2/CEP 17 of 1.1 interpreted as negative.   The patient was seen by Dr. SAEED Moser of general surgery September 19 . it was decided to proceed with simple mastectomy and sentinel node biopsy.  This was performed on September 20.  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  The sentinel lymph node examined was negative with his subsequent staging being PT1c PN 0 MX-stage I disease .  The patient is seen after surgery and has recovered relatively quickly with little additional pain medication.  Now presents for concerns for adjuvant therapy.    The patient was  reviewed October 19 and is felt his prognosis was actually felt to be overall good though this could be improved by adjuvant tamoxifen.  He was placed on 20 mg daily anticipated with for potentially 5 years.  She is now seen back December for tolerating it well with no particular side effect.  Both he and his wife state that he is doing about the same though it is also apparent that he has not yet been vaccinated with flu season and have urged him to have this done.   Mr. Wilde is seen with his wife March 06, 2018.  In discussing both very issues with them he has lost nearly 10 pounds and possibly more in the last several months.  He indicates he is not having significant discomfort  but that his appetite is poor.      With the patient's assessment March 6 he was found to have right axillary adenopathy.  This appeared to be a single node and a CT scan the chest was obtained revealing a 2.6 x 2.2 cm enlarged lymph node in the right axilla that was new from previous scanning.  There was not pathologically enlarged right axillary lymph nodes, no mediastinal, hilar or left axillary lymphadenopathy nor evidence of disease involving the right mastectomy site.  Lung windows showed no pulmonary nodules except an 8 mm nodule in the right lower lobe that was stable.  No other abnormalities were seen.  The patient was referred to Dr. SAEED Moser for surgical resection and when he is seen April 3 is scheduled for the procedure on April 6.       The patient did proceed on April 6 with excision of deep right axillary mass.  Operative report reads that the mass was easily palpable and did not have appearance of the lymph node.  It was excised completely and found to be fibroadipose tissue with benign capsule-evolving seroma.  The patient has done well postoperatively and continues to recover.  He is back on tamoxifen and tolerating it well.    Past Medical History:   Diagnosis Date   • Arthritis    • BPH (benign prostatic hyperplasia)     • Breast cancer     RIGHT   • Chronic low back pain    • Hard of hearing     LOKESH HEARING AIDS   • Lymph node enlargement     RIGHT AXILLA   • Melanoma     FROM BACK   • Prostate cancer     HISTORY RADIATION TREATMENT   • Seasonal allergies         Past Surgical History:   Procedure Laterality Date   • AXILLARY LYMPH NODE BIOPSY/EXCISION Right 4/6/2018    Procedure: AXILLARY LYMPH NODE BIOPSY/EXCISION, RIGHT;  Surgeon: Marito Cazares MD;  [place] [facility] [place] [facility] [place]: General   • HERNIA REPAIR      LEFT AND RIGHT   • LAPAROSCOPIC CHOLECYSTECTOMY     • LUMBAR DECOMPRESSION     • MASTECTOMY Right 9/20/2017    Procedure: BREAST MASTECTOMY WITH SENTINEL NODE BIOPSY, RIGHT;  Surgeon: Marito Cazares MD;  Location: [facility];  Service:    • SKIN CANCER EXCISION      SEVERAL MELANOMA REMOVED FROM BACK        Current Outpatient Prescriptions on File Prior to Visit   Medication Sig Dispense Refill   • HYDROcodone-acetaminophen (NORCO) 5-325 MG per tablet Take 1-2 tablets by mouth Every 4 (Four) Hours As Needed (Pain) for up to 20 doses. 20 tablet 0   • Probiotic Product (PROBIOTIC PO) Take 1 tablet by mouth Daily.     • tamoxifen (NOLVADEX) 20 MG chemo tablet Take 1 tablet by mouth Daily. 90 tablet 3   • Turmeric (CURCUMIN 95 PO) Take 1 tablet by mouth Daily. HELD FOR SURGERY       No current facility-administered medications on file prior to visit.         ALLERGIES:    Allergies   Allergen Reactions   • Penicillins Anaphylaxis        Social History     Social History   • Marital status:      Spouse name: Natali   • Years of education: College     Occupational History   • Research criminologist Retired     Social History Main Topics   • Smoking status: Former Smoker     Years: 5.00     Types: Cigarettes   • Smokeless tobacco: Never Used      Comment: ONLY FOR COUPLE YEARS QUIT EARLY 50'S   • Alcohol use Yes      Comment: occasional   • Drug use: No     Other Topics Concern   • Not on file        Family  "History   Problem Relation Age of Onset   • Prostate cancer Father    • Melanoma Father    • Heart disease Mother    • Malig Hyperthermia Neg Hx         Review of Systems   Gen.:, Weight loss noted after recent dietary change made as a result of needs of wife though this appears to be worsening  HEENT: Bilateral deafness  10 additional systems negative except for periodic low back pain recognized    Objective     Vitals:    05/01/18 1422   BP: 170/92   Pulse: 66   Resp: 16   Temp: 97.5 °F (36.4 °C)   TempSrc: Oral   SpO2: 99%   Weight: 65.8 kg (145 lb)  Comment: states weigt   Height: 175 cm (68.9\")   PainSc: 0-No pain     Current Status 5/1/2018   ECOG score 1       Physical Exam   Constitutional: He is oriented to person, place, and time. He appears well-developed and well-nourished. No distress.   HENT:   Head: Normocephalic and atraumatic.   Eyes: EOM are normal. Pupils are equal, round, and reactive to light.   Neck: Normal range of motion. Neck supple. No thyromegaly present.   Musculoskeletal: Normal range of motion.   Lymphadenopathy: no cervical, supraclavicular adenopathy, status post right axillary surgery with no additional adenopathy or mass palpable  Chest wall: Patient status post right simple mastectomy with healing suture line, no additional discharge or masses noted  Cardiovascular: Normal rate, regular rhythm, 2/6 systolic ejection murmur noted at apex   Neurological: He is alert and oriented to person, place, and time. No cranial nerve deficit. Coordination normal.         RECENT LABS:  Hematology WBC   Date Value Ref Range Status   05/01/2018 7.77 4.50 - 10.70 10*3/mm3 Final     RBC   Date Value Ref Range Status   05/01/2018 4.21 (L) 4.60 - 6.00 10*6/mm3 Final     Hemoglobin   Date Value Ref Range Status   05/01/2018 13.1 (L) 13.7 - 17.6 g/dL Final     Hematocrit   Date Value Ref Range Status   05/01/2018 40.4 40.4 - 52.2 % Final     Platelets   Date Value Ref Range Status   05/01/2018 245 140 - " 500 10*3/mm3 Final      CT CHEST W CONTRAST- March 13, 2018  FINDINGS: There is a 2.6 x 2.2 cm diameter enlarged lymph node in the  right axilla that is new since the preceding CT scan chest. A few  additional shotty nonpathologically enlarged right axillary lymph nodes  are also noted. There is no mediastinal or hilar or left axillary  lymphadenopathy. There is no evidence of recurrent neoplasm at the site  of the right mastectomy. Lung window images demonstrate an 8 mm diameter  noncalcified pulmonary nodule in the anterior aspect of the right lower  lobe that is stable. No other pulmonary nodules are identified. There  are no parenchymal infiltrates or pleural effusions. Images through the  upper abdomen show no significant abnormality. A tiny pericardial  effusion is noted.     IMPRESSION:  Solitary mildly enlarged right axillary lymph node  suspicious for localized metastatic disease in this patient with history  of breast carcinoma on the right. A noncalcified pulmonary nodule in the  right lower lobe has been stable since CT imaging dating back to  10/16/2014, and is therefore benign. There is no evidence of recurrent  neoplasm at the mastectomy site.    Assessment/Plan        89-year-old male followed with hypertension and previous surgeries including cholecystectomy and previous surgery for spinal stenosis who noted in the last several months development of unilateral gynecomastia on the right.  This was reviewed by physicians including his dermatologist to previously had assessed for lipomas but felt that this was a new abnormality.  His subsequent studies went on to reveal a mass localized in the retroareolar right breast with associated nipple retraction and thickening measuring up to 2 cm.  Subsequent biopsy was consistent with invasive mammary carcinoma of intermediate grade, ER positive at 90%, VA at 50% and HER-2 negative.  This was surgically addressed by Gen. surgery with simple mastectomy and  sentinel node biopsy September 20.  The patient did extraordinarily well perioperatively has had no additional complications.  His subsequent pathology review reveals a stage I breast cancer- [id number].    The patient is seen for consultation October 19 and is reviewed with him the treatment of this particular disease-early stage breast cancer-is addressed the same in both men and women.  We reviewed his prognosis which is actually felt to be overall good plan that which could be improved by using adjuvant therapy and, in particular, the use of Tamoxifen.  He is not expected to have any serious complications to use this medication and is not on any additional medications or therapies that would interfere with its use.  He therefore agrees to a trial with plan to initiate 20 mg daily anticipating use for up to 5 years.  He is agreeable to this plan and will be seen back in the next 4-6 weeks coordinate his schedule with his wife was also seen in the practice.  Thank you very much for allowing me see this patient consultation and please let me know if any questions concerning this case.    The patient seen back December 05, 2015 tolerating tamoxifen well and would plan to continue same.  He'll be seen back in 3 months and interval he'll have a flu vaccination dose high-dose.    The patient is seen back March 06, 2018.  He has lost additional weight and also has an abnormality per his right axilla with adenopathy.  I discussed with him this could well be a recurrence.  We went on to have him assessed via CT scan of the chest as above and he was referred to surgery for removal on April 6.  This occurred with removal of right axillary mass found to be fibroadipose tissue with benign cyst-likely involving seroma.  There was no evidence of malignancy.  The patient is asked to restart tamoxifen and continue its use.  We'll plan to reassess him in approximately 4 months.

## 2018-08-22 ENCOUNTER — OFFICE VISIT (OUTPATIENT)
Dept: ONCOLOGY | Facility: CLINIC | Age: 83
End: 2018-08-22

## 2018-08-22 ENCOUNTER — LAB (OUTPATIENT)
Dept: OTHER | Facility: HOSPITAL | Age: 83
End: 2018-08-22

## 2018-08-22 VITALS
HEIGHT: 69 IN | DIASTOLIC BLOOD PRESSURE: 75 MMHG | BODY MASS INDEX: 21.48 KG/M2 | OXYGEN SATURATION: 99 % | RESPIRATION RATE: 16 BRPM | TEMPERATURE: 97.3 F | SYSTOLIC BLOOD PRESSURE: 114 MMHG | WEIGHT: 145 LBS | HEART RATE: 74 BPM

## 2018-08-22 DIAGNOSIS — C50.121 MALIGNANT NEOPLASM OF CENTRAL PORTION OF RIGHT BREAST IN MALE, ESTROGEN RECEPTOR POSITIVE (HCC): ICD-10-CM

## 2018-08-22 DIAGNOSIS — Z17.0 MALIGNANT NEOPLASM OF CENTRAL PORTION OF RIGHT BREAST IN MALE, ESTROGEN RECEPTOR POSITIVE (HCC): ICD-10-CM

## 2018-08-22 DIAGNOSIS — Z85.3 HX OF BREAST CANCER: ICD-10-CM

## 2018-08-22 DIAGNOSIS — N62 GYNECOMASTIA, MALE: Primary | ICD-10-CM

## 2018-08-22 DIAGNOSIS — R59.1 LYMPHADENOPATHY: ICD-10-CM

## 2018-08-22 DIAGNOSIS — D51.8 OTHER VITAMIN B12 DEFICIENCY ANEMIA: Primary | ICD-10-CM

## 2018-08-22 LAB
BASOPHILS # BLD AUTO: 0.07 10*3/MM3 (ref 0–0.2)
BASOPHILS NFR BLD AUTO: 1 % (ref 0–1.5)
DEPRECATED RDW RBC AUTO: 50.6 FL (ref 37–54)
EOSINOPHIL # BLD AUTO: 0.22 10*3/MM3 (ref 0–0.7)
EOSINOPHIL NFR BLD AUTO: 3 % (ref 0.3–6.2)
ERYTHROCYTE [DISTWIDTH] IN BLOOD BY AUTOMATED COUNT: 14.4 % (ref 11.5–14.5)
HCT VFR BLD AUTO: 38 % (ref 40.4–52.2)
HGB BLD-MCNC: 12.2 G/DL (ref 13.7–17.6)
IMM GRANULOCYTES # BLD: 0.07 10*3/MM3 (ref 0–0.03)
IMM GRANULOCYTES NFR BLD: 1 % (ref 0–0.5)
LYMPHOCYTES # BLD AUTO: 1.82 10*3/MM3 (ref 0.9–4.8)
LYMPHOCYTES NFR BLD AUTO: 25.1 % (ref 19.6–45.3)
MCH RBC QN AUTO: 30.8 PG (ref 27–32.7)
MCHC RBC AUTO-ENTMCNC: 32.1 G/DL (ref 32.6–36.4)
MCV RBC AUTO: 96 FL (ref 79.8–96.2)
MONOCYTES # BLD AUTO: 0.51 10*3/MM3 (ref 0.2–1.2)
MONOCYTES NFR BLD AUTO: 7 % (ref 5–12)
NEUTROPHILS # BLD AUTO: 4.56 10*3/MM3 (ref 1.9–8.1)
NEUTROPHILS NFR BLD AUTO: 62.9 % (ref 42.7–76)
NRBC BLD MANUAL-RTO: 0 /100 WBC (ref 0–0)
PLATELET # BLD AUTO: 205 10*3/MM3 (ref 140–500)
PMV BLD AUTO: 9.5 FL (ref 6–12)
RBC # BLD AUTO: 3.96 10*6/MM3 (ref 4.6–6)
WBC NRBC COR # BLD: 7.25 10*3/MM3 (ref 4.5–10.7)

## 2018-08-22 PROCEDURE — 85025 COMPLETE CBC W/AUTO DIFF WBC: CPT | Performed by: INTERNAL MEDICINE

## 2018-08-22 PROCEDURE — 36415 COLL VENOUS BLD VENIPUNCTURE: CPT

## 2018-08-22 PROCEDURE — 99213 OFFICE O/P EST LOW 20 MIN: CPT | Performed by: INTERNAL MEDICINE

## 2018-08-22 NOTE — PROGRESS NOTES
Subjective   tolerating tamoxifen well  REASON FOR CONSULTATION:  Male breast cancer  Provide an opinion on any further workup or treatment                             REQUESTING PHYSICIAN:  Evans Porter M.D., Marito Moser M.D.    History of Present Illness       Patient is a 89-year-old male followed by primary care with hypertension, B12 deficiency and hyperlipidemia.  He had presented to his physician August 23 with development of gynecomastia in the right breast.  He had previous several lipomas and saw his dermatologist who felt this might be a similar issue but that it should be assessed and ultimately additional exams were performed.  These included mammogram and ultrasound August 29 demonstrating a localized mass in the retroareolar right breast and associated nipple retraction and thickening measuring up to 1.8 x 2 cm correlating with right breast ultrasound when voiding subareolar mass was also found measuring 1.1 x 1.6.  This led to biopsy September 12, 2017 which was consistent with an invasive mammary carcinoma, intermediate grade with cancer involving multiple fragments spanning at least 8 mm.Biomarker analysis included ER of 90% and OR 50%, HER-2 2+ leading to additional testing-HER-2/CEP 17 of 1.1 interpreted as negative.   The patient was seen by Dr. SAEED Moser of general surgery September 19 . it was decided to proceed with simple mastectomy and sentinel node biopsy.  This was performed on September 20.  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  The sentinel lymph node examined was negative with his subsequent staging being PT1c PN 0 MX-stage I disease .  The patient is seen after surgery and has recovered relatively quickly with little additional pain medication.  Now presents for concerns for adjuvant therapy.    The patient was reviewed October 19 and is felt his prognosis  was actually felt to be overall good though this could be improved by adjuvant tamoxifen.  He was placed on 20 mg daily anticipated with for potentially 5 years.  She is now seen back December for tolerating it well with no particular side effect.  Both he and his wife state that he is doing about the same though it is also apparent that he has not yet been vaccinated with flu season and have urged him to have this done.   Mr. Wilde is seen with his wife March 06, 2018.  In discussing both very issues with them he has lost nearly 10 pounds and possibly more in the last several months.  He indicates he is not having significant discomfort  but that his appetite is poor.      With the patient's assessment March 6 he was found to have right axillary adenopathy.  This appeared to be a single node and a CT scan the chest was obtained revealing a 2.6 x 2.2 cm enlarged lymph node in the right axilla that was new from previous scanning.  There was not pathologically enlarged right axillary lymph nodes, no mediastinal, hilar or left axillary lymphadenopathy nor evidence of disease involving the right mastectomy site.  Lung windows showed no pulmonary nodules except an 8 mm nodule in the right lower lobe that was stable.  No other abnormalities were seen.  The patient was referred to Dr. SAEED Moser for surgical resection and when he is seen April 3 is scheduled for the procedure on April 6.       The patient did proceed on April 6 with excision of deep right axillary mass.  Operative report reads that the mass was easily palpable and did not have appearance of the lymph node.  It was excised completely and found to be fibroadipose tissue with benign capsule-evolving seroma.  The patient has done well postoperatively and continues to recover.  He is back on tamoxifen and tolerating it well.    Patient seen back August 22, 2018.  He has not had any additional side effects including lack of any thrombotic issues.    Past Medical  History:   Diagnosis Date   • Arthritis    • BPH (benign prostatic hyperplasia)    • Breast cancer (CMS/HCC)     RIGHT   • Chronic low back pain    • Hard of hearing     LOKESH HEARING AIDS   • Lymph node enlargement     RIGHT AXILLA   • Melanoma (CMS/HCC)     FROM BACK   • Prostate cancer (CMS/HCC)     HISTORY RADIATION TREATMENT   • Seasonal allergies         Past Surgical History:   Procedure Laterality Date   • AXILLARY LYMPH NODE BIOPSY/EXCISION Right 4/6/2018    Procedure: AXILLARY LYMPH NODE BIOPSY/EXCISION, RIGHT;  Surgeon: Marito Cazares MD;  Location: [facility] OR [facility] [place];  Service: General   • HERNIA REPAIR      LEFT AND RIGHT   • LAPAROSCOPIC CHOLECYSTECTOMY     • LUMBAR DECOMPRESSION     • MASTECTOMY Right 9/20/2017    Procedure: BREAST MASTECTOMY WITH SENTINEL NODE BIOPSY, RIGHT;  Surgeon: Marito Cazares MD;  Location: [facility] OR [facility] [place];  Service:    • SKIN CANCER EXCISION      SEVERAL MELANOMA REMOVED FROM BACK        Current Outpatient Prescriptions on File Prior to Visit   Medication Sig Dispense Refill   • HYDROcodone-acetaminophen (NORCO) 5-325 MG per tablet Take 1-2 tablets by mouth Every 4 (Four) Hours As Needed (Pain) for up to 20 doses. 20 tablet 0   • Probiotic Product (PROBIOTIC PO) Take 1 tablet by mouth Daily.     • tamoxifen (NOLVADEX) 20 MG chemo tablet Take 1 tablet by mouth Daily. 90 tablet 3   • Turmeric (CURCUMIN 95 PO) Take 1 tablet by mouth Daily. HELD FOR SURGERY       No current facility-administered medications on file prior to visit.         ALLERGIES:    Allergies   Allergen Reactions   • Penicillins Anaphylaxis        Social History     Social History   • Marital status:      Spouse name: Natali   • Years of education: College     Occupational History   • Research criminologist Retired     Social History Main Topics   • Smoking status: Former Smoker     Years: 5.00     Types: Cigarettes   • Smokeless tobacco: Never Used      Comment: ONLY FOR COUPLE YEARS QUIT EARLY 50'S   •  "Alcohol use Yes      Comment: occasional   • Drug use: No     Other Topics Concern   • Not on file        Family History   Problem Relation Age of Onset   • Prostate cancer Father    • Melanoma Father    • Heart disease Mother    • Malig Hyperthermia Neg Hx         Review of Systems   Gen.:, Weight loss noted after recent dietary change made as a result of needs of wife though this appears to be worsening  HEENT: Bilateral deafness  10 additional systems negative except for periodic low back pain recognized    Objective     Vitals:    08/22/18 1340   BP: 114/75   Pulse: 74   Resp: 16   Temp: 97.3 °F (36.3 °C)   TempSrc: Oral   SpO2: 99%   Weight: 65.8 kg (145 lb)  Comment: STATES WT   Height: 175 cm (68.9\")   PainSc: 0-No pain     Current Status 8/22/2018   ECOG score 0       Physical Exam   Constitutional: He is oriented to person, place, and time. He appears well-developed and well-nourished. No distress.   HENT:   Head: Normocephalic and atraumatic.   Eyes: EOM are normal. Pupils are equal, round, and reactive to light.   Neck: Normal range of motion. Neck supple. No thyromegaly present.   Musculoskeletal: Normal range of motion.   Lymphadenopathy: no cervical, supraclavicular adenopathy, status post right axillary surgery with no additional adenopathy or mass palpable  Chest wall: Patient status post right simple mastectomy with healing suture line, no additional discharge or masses noted  Cardiovascular: Normal rate, regular rhythm, 2/6 systolic ejection murmur noted at apex   Neurological: He is alert and oriented to person, place, and time. No cranial nerve deficit. Coordination normal.         RECENT LABS:  Hematology WBC   Date Value Ref Range Status   08/22/2018 7.25 4.50 - 10.70 10*3/mm3 Final     RBC   Date Value Ref Range Status   08/22/2018 3.96 (L) 4.60 - 6.00 10*6/mm3 Final     Hemoglobin   Date Value Ref Range Status   08/22/2018 12.2 (L) 13.7 - 17.6 g/dL Final     Hematocrit   Date Value Ref Range " Status   08/22/2018 38.0 (L) 40.4 - 52.2 % Final     Platelets   Date Value Ref Range Status   08/22/2018 205 140 - 500 10*3/mm3 Final      CT CHEST W CONTRAST- March 13, 2018  FINDINGS: There is a 2.6 x 2.2 cm diameter enlarged lymph node in the  right axilla that is new since the preceding CT scan chest. A few  additional shotty nonpathologically enlarged right axillary lymph nodes  are also noted. There is no mediastinal or hilar or left axillary  lymphadenopathy. There is no evidence of recurrent neoplasm at the site  of the right mastectomy. Lung window images demonstrate an 8 mm diameter  noncalcified pulmonary nodule in the anterior aspect of the right lower  lobe that is stable. No other pulmonary nodules are identified. There  are no parenchymal infiltrates or pleural effusions. Images through the  upper abdomen show no significant abnormality. A tiny pericardial  effusion is noted.     IMPRESSION:  Solitary mildly enlarged right axillary lymph node  suspicious for localized metastatic disease in this patient with history  of breast carcinoma on the right. A noncalcified pulmonary nodule in the  right lower lobe has been stable since CT imaging dating back to  10/16/2014, and is therefore benign. There is no evidence of recurrent  neoplasm at the mastectomy site.    Assessment/Plan        89-year-old male followed with hypertension and previous surgeries including cholecystectomy and previous surgery for spinal stenosis who noted in the last several months development of unilateral gynecomastia on the right.  This was reviewed by physicians including his dermatologist to previously had assessed for lipomas but felt that this was a new abnormality.  His subsequent studies went on to reveal a mass localized in the retroareolar right breast with associated nipple retraction and thickening measuring up to 2 cm.  Subsequent biopsy was consistent with invasive mammary carcinoma of intermediate grade, ER positive  at 90%, IL at 50% and HER-2 negative.  This was surgically addressed by Gen. surgery with simple mastectomy and sentinel node biopsy September 20.  The patient did extraordinarily well perioperatively has had no additional complications.  His subsequent pathology review reveals a stage I breast cancer- [id number].    The patient is seen for consultation October 19 and is reviewed with him the treatment of this particular disease-early stage breast cancer-is addressed the same in both men and women.  We reviewed his prognosis which is actually felt to be overall good plan that which could be improved by using adjuvant therapy and, in particular, the use of Tamoxifen.  He is not expected to have any serious complications to use this medication and is not on any additional medications or therapies that would interfere with its use.  He therefore agrees to a trial with plan to initiate 20 mg daily anticipating use for up to 5 years.  He is agreeable to this plan and will be seen back in the next 4-6 weeks coordinate his schedule with his wife was also seen in the practice.  Thank you very much for allowing me see this patient consultation and please let me know if any questions concerning this case.    The patient seen back December 05, 2015 tolerating tamoxifen well and would plan to continue same.  He'll be seen back in 3 months and interval he'll have a flu vaccination dose high-dose.    The patient is seen back March 06, 2018.  He has lost additional weight and also has an abnormality per his right axilla with adenopathy.  I discussed with him this could well be a recurrence.  We went on to have him assessed via CT scan of the chest as above and he was referred to surgery for removal on April 6.  This occurred with removal of right axillary mass found to be fibroadipose tissue with benign cyst-likely involving seroma.  There was no evidence of malignancy.  The patient is asked to restart tamoxifen and continue its  use.  We'll plan to reassess him in approximately 4 months.     The patient is seen back August 22, 201 tolerating tamoxifen well.  We'll plan to continue same and see him back in 6 months.

## 2018-09-14 ENCOUNTER — OFFICE VISIT (OUTPATIENT)
Dept: FAMILY MEDICINE CLINIC | Facility: CLINIC | Age: 83
End: 2018-09-14

## 2018-09-14 VITALS
BODY MASS INDEX: 21.48 KG/M2 | WEIGHT: 145 LBS | HEART RATE: 73 BPM | TEMPERATURE: 98.5 F | OXYGEN SATURATION: 96 % | HEIGHT: 69 IN | SYSTOLIC BLOOD PRESSURE: 136 MMHG | DIASTOLIC BLOOD PRESSURE: 78 MMHG

## 2018-09-14 DIAGNOSIS — I10 ESSENTIAL HYPERTENSION: ICD-10-CM

## 2018-09-14 DIAGNOSIS — K59.1 FUNCTIONAL DIARRHEA: ICD-10-CM

## 2018-09-14 DIAGNOSIS — Z00.00 MEDICARE ANNUAL WELLNESS VISIT, INITIAL: Primary | ICD-10-CM

## 2018-09-14 PROCEDURE — 96160 PT-FOCUSED HLTH RISK ASSMT: CPT | Performed by: FAMILY MEDICINE

## 2018-09-14 PROCEDURE — G0439 PPPS, SUBSEQ VISIT: HCPCS | Performed by: FAMILY MEDICINE

## 2018-09-14 RX ORDER — MUSCLE RUB CREAM 100; 150 MG/G; MG/G
CREAM TOPICAL
Status: ON HOLD | COMMUNITY
Start: 2018-07-02 | End: 2019-01-12

## 2018-09-14 RX ORDER — LOPERAMIDE HYDROCHLORIDE 2 MG/1
CAPSULE, LIQUID FILLED ORAL
Status: ON HOLD | COMMUNITY
Start: 2018-07-02 | End: 2019-01-12

## 2018-09-14 NOTE — PATIENT INSTRUCTIONS
This is an extremely nice 89-year-old who is here for welcome to Medicare visit and follow-up for his blood pressure.  He also has been having problem with diarrhea alternating with constipation.  I would recommend he take one half dose of Metamucil daily and hold off on using Imodium.  This does not help regulate his bowels I would like him to call and I will recommend gastroenterology consult.

## 2018-09-14 NOTE — PROGRESS NOTES
QUICK REFERENCE INFORMATION:  The ABCs of the Annual Wellness Visit    Subsequent Medicare Wellness Visit    HEALTH RISK ASSESSMENT    9/22/1926    Recent Hospitalizations:  No hospitalization(s) within the last year..        Current Medical Providers:  Patient Care Team:  Evans Porter MD as PCP - General  Marito Cazares MD as Referring Physician (General Surgery)  James Todd MD as Consulting Physician (Hematology and Oncology)        Smoking Status:  History   Smoking Status   • Former Smoker   • Years: 5.00   • Types: Cigarettes   Smokeless Tobacco   • Never Used     Comment: ONLY FOR COUPLE YEARS QUIT EARLY 50'S       Alcohol Consumption:  History   Alcohol Use   • Yes     Comment: occasional       Depression Screen:   PHQ-2/PHQ-9 Depression Screening 9/14/2018   Little interest or pleasure in doing things 0   Feeling down, depressed, or hopeless 2   Trouble falling or staying asleep, or sleeping too much 0   Feeling tired or having little energy 3   Poor appetite or overeating 0   Feeling bad about yourself - or that you are a failure or have let yourself or your family down 0   Trouble concentrating on things, such as reading the newspaper or watching television 0   Moving or speaking so slowly that other people could have noticed. Or the opposite - being so fidgety or restless that you have been moving around a lot more than usual 0   Thoughts that you would be better off dead, or of hurting yourself in some way 0   Total Score 5   If you checked off any problems, how difficult have these problems made it for you to do your work, take care of things at home, or get along with other people? Not difficult at all       Health Habits and Functional and Cognitive Screening:  Functional & Cognitive Status 9/14/2018   Do you have difficulty preparing food and eating? No   Do you have difficulty bathing yourself, getting dressed or grooming yourself? No   Do you have difficulty using the toilet? No    Do you have difficulty moving around from place to place? No   Do you have trouble with steps or getting out of a bed or a chair? Yes   In the past year have you fallen or experienced a near fall? No   Current Diet Unhealthy Diet   Dental Exam Not up to date   Eye Exam Up to date   Exercise (times per week) 0 times per week   Current Exercise Activities Include None   Do you need help using the phone?  No   Are you deaf or do you have serious difficulty hearing?  Yes   Do you need help with transportation? Yes   Do you need help shopping? No   Do you need help preparing meals?  No   Do you need help with housework?  No   Do you need help with laundry? No   Do you need help taking your medications? No   Do you need help managing money? No   Do you ever drive or ride in a car without wearing a seat belt? No   Have you felt unusual stress, anger or loneliness in the last month? No   Who do you live with? Spouse   If you need help, do you have trouble finding someone available to you? No   Have you been bothered in the last four weeks by sexual problems? No   Do you have difficulty concentrating, remembering or making decisions? Yes           Does the patient have evidence of cognitive impairment? No    Aspirin use counseling: Does not need ASA (and currently is not on it)      Recent Lab Results:  CMP:  Lab Results   Component Value Date    GLU 87 05/19/2017    BUN 21 09/19/2017    CREATININE 0.80 03/13/2018    EGFRIFNONA 99 09/19/2017    EGFRIFAFRI 109 05/19/2017    BCR 28.4 (H) 09/19/2017     09/19/2017    K 3.5 09/19/2017    CO2 31.4 (H) 09/19/2017    CALCIUM 9.1 09/19/2017    PROTENTOTREF 6.0 05/19/2017    ALBUMIN 3.90 05/19/2017    LABGLOBREF 2.1 05/19/2017    LABIL2 1.9 05/19/2017    BILITOT 1.0 05/19/2017    ALKPHOS 53 05/19/2017    AST 11 05/19/2017    ALT 12 05/19/2017     Lipid Panel:  Lab Results   Component Value Date    TRIG 78 06/29/2016    HDL 50 06/29/2016    VLDL 15.6 06/29/2016    LDLHDL 2.21  06/29/2016     HbA1c:  Lab Results   Component Value Date    HGBA1C 5.3 05/07/2015       Visual Acuity:  No exam data present    Age-appropriate Screening Schedule:  Refer to the list below for future screening recommendations based on patient's age, sex and/or medical conditions. Orders for these recommended tests are listed in the plan section. The patient has been provided with a written plan.    Health Maintenance   Topic Date Due   • TDAP/TD VACCINES (1 - Tdap) 09/22/1945   • ZOSTER VACCINE (2 of 3) 12/05/2014   • PNEUMOCOCCAL VACCINES (65+ LOW/MEDIUM RISK) (2 of 2 - PCV13) 06/24/2017   • INFLUENZA VACCINE  08/01/2018        Subjective   History of Present Illness    Nicolas Wilde is a 89 y.o. male who presents for an Subsequent Wellness Visit.    The following portions of the patient's history were reviewed and updated as appropriate: current medications, past family history, past medical history, past social history, past surgical history and problem list.    Outpatient Medications Prior to Visit   Medication Sig Dispense Refill   • HYDROcodone-acetaminophen (NORCO) 5-325 MG per tablet Take 1-2 tablets by mouth Every 4 (Four) Hours As Needed (Pain) for up to 20 doses. 20 tablet 0   • tamoxifen (NOLVADEX) 20 MG chemo tablet Take 1 tablet by mouth Daily. 90 tablet 3   • Turmeric (CURCUMIN 95 PO) Take 1 tablet by mouth Daily. HELD FOR SURGERY     • Probiotic Product (PROBIOTIC PO) Take 1 tablet by mouth Daily.       No facility-administered medications prior to visit.        Patient Active Problem List   Diagnosis   • Essential hypertension   • Palpitations   • Anemia due to vitamin B12 deficiency   • Functional diarrhea   • Gynecomastia, male   • Malignant neoplasm of central portion of right male breast (CMS/HCC)   • Lymphadenopathy   • Hx of breast cancer   • Medicare annual wellness visit, initial       Advance Care Planning:  has an advance directive - a copy HAS NOT been provided    Identification of  "Risk Factors:  Risk factors include: weight , inactivity, increased fall risk and hearing limitations.    Review of Systems   Gastrointestinal: Positive for constipation and diarrhea.   Musculoskeletal: Positive for back pain.   All other systems reviewed and are negative.      Compared to one year ago, the patient feels his physical health is the same.  Compared to one year ago, the patient feels his mental health is the same.    Objective     Physical Exam   Constitutional: He is oriented to person, place, and time. He appears well-developed and well-nourished.   HENT:   Head: Normocephalic and atraumatic.   Eyes: Pupils are equal, round, and reactive to light. EOM are normal.   Neck: Normal range of motion. Neck supple.   Cardiovascular: Normal rate and regular rhythm.    Murmur (2/6 holosystolic murmur best heard at the left sternal border) heard.  Pulmonary/Chest: Effort normal and breath sounds normal. No respiratory distress. He has no wheezes.   Abdominal: Soft. Bowel sounds are normal. He exhibits no distension. There is no tenderness.   Musculoskeletal: Normal range of motion. He exhibits no edema.   Neurological: He is alert and oriented to person, place, and time.   Skin: Skin is warm and dry.   Psychiatric: He has a normal mood and affect. His behavior is normal.   Nursing note and vitals reviewed.      Vitals:    09/14/18 1338   BP: 136/78   Pulse: 73   Temp: 98.5 °F (36.9 °C)   SpO2: 96%   Weight: 65.8 kg (145 lb)   Height: 175 cm (68.9\")       Patient's Body mass index is 21.47 kg/m². BMI is within normal parameters. No follow-up required.      Assessment/Plan   Patient Self-Management and Personalized Health Advice  The patient has been provided with information about: diet and fall prevention and preventive services including:   · Diabetes screening, see lab orders.    Visit Diagnoses:    ICD-10-CM ICD-9-CM   1. Medicare annual wellness visit, initial Z00.00 V70.0   2. Essential hypertension I10 " 401.9   3. Functional diarrhea K59.1 564.5       Orders Placed This Encounter   Procedures   • Comprehensive Metabolic Panel   • TSH       Outpatient Encounter Prescriptions as of 9/14/2018   Medication Sig Dispense Refill   • Acetaminophen 500 MG coapsule      • ANTI-DIARRHEAL 2 MG capsule      • Cetirizine HCl 10 MG capsule      • HYDROcodone-acetaminophen (NORCO) 5-325 MG per tablet Take 1-2 tablets by mouth Every 4 (Four) Hours As Needed (Pain) for up to 20 doses. 20 tablet 0   • muscle rub (Omega-Darden) 10-15 % cream cream      • SENNA LAXATIVE 25 MG tablet As needed     • tamoxifen (NOLVADEX) 20 MG chemo tablet Take 1 tablet by mouth Daily. 90 tablet 3   • Turmeric (CURCUMIN 95 PO) Take 1 tablet by mouth Daily. HELD FOR SURGERY     • Probiotic Product (PROBIOTIC PO) Take 1 tablet by mouth Daily.       No facility-administered encounter medications on file as of 9/14/2018.        Reviewed use of high risk medication in the elderly: not applicable  Reviewed for potential of harmful drug interactions in the elderly: yes    Follow Up:  Return in about 6 months (around 3/14/2019) for Recheck.     An After Visit Summary and PPPS with all of these plans were given to the patient.

## 2018-09-15 LAB
ALBUMIN SERPL-MCNC: 4 G/DL (ref 3.5–5.2)
ALBUMIN/GLOB SERPL: 1.9 G/DL
ALP SERPL-CCNC: 158 U/L (ref 39–117)
ALT SERPL-CCNC: 10 U/L (ref 1–41)
AST SERPL-CCNC: 13 U/L (ref 1–40)
BILIRUB SERPL-MCNC: 0.6 MG/DL (ref 0.1–1.2)
BUN SERPL-MCNC: 17 MG/DL (ref 8–23)
BUN/CREAT SERPL: 26.6 (ref 7–25)
CALCIUM SERPL-MCNC: 8.8 MG/DL (ref 8.2–9.6)
CHLORIDE SERPL-SCNC: 100 MMOL/L (ref 98–107)
CO2 SERPL-SCNC: 31.9 MMOL/L (ref 22–29)
CREAT SERPL-MCNC: 0.64 MG/DL (ref 0.76–1.27)
GLOBULIN SER CALC-MCNC: 2.1 GM/DL
GLUCOSE SERPL-MCNC: 92 MG/DL (ref 65–99)
POTASSIUM SERPL-SCNC: 4 MMOL/L (ref 3.5–5.2)
PROT SERPL-MCNC: 6.1 G/DL (ref 6–8.5)
SODIUM SERPL-SCNC: 140 MMOL/L (ref 136–145)
TSH SERPL DL<=0.005 MIU/L-ACNC: 2.36 MIU/ML (ref 0.27–4.2)

## 2018-10-29 ENCOUNTER — OFFICE VISIT (OUTPATIENT)
Dept: FAMILY MEDICINE CLINIC | Facility: CLINIC | Age: 83
End: 2018-10-29

## 2018-10-29 VITALS
HEART RATE: 76 BPM | BODY MASS INDEX: 20.59 KG/M2 | SYSTOLIC BLOOD PRESSURE: 122 MMHG | OXYGEN SATURATION: 95 % | WEIGHT: 139 LBS | DIASTOLIC BLOOD PRESSURE: 80 MMHG | TEMPERATURE: 98 F | HEIGHT: 69 IN

## 2018-10-29 DIAGNOSIS — M54.5 ACUTE LOW BACK PAIN, UNSPECIFIED BACK PAIN LATERALITY, WITH SCIATICA PRESENCE UNSPECIFIED: Primary | ICD-10-CM

## 2018-10-29 DIAGNOSIS — R30.0 DYSURIA: ICD-10-CM

## 2018-10-29 PROBLEM — K59.1 FUNCTIONAL DIARRHEA: Status: RESOLVED | Noted: 2017-05-19 | Resolved: 2018-10-29

## 2018-10-29 LAB
BILIRUB BLD-MCNC: NEGATIVE MG/DL
CLARITY, POC: CLEAR
COLOR UR: YELLOW
GLUCOSE UR STRIP-MCNC: NEGATIVE MG/DL
HCT VFR BLDA CALC: 29.7 %
HGB BLDA-MCNC: 9.5 G/DL
KETONES UR QL: NEGATIVE
LEUKOCYTE EST, POC: NEGATIVE
MCH, POC: 31.7
MCHC, POC: 32
MCV, POC: 98.9
NITRITE UR-MCNC: NEGATIVE MG/ML
PH UR: 6 [PH] (ref 5–8)
PLATELET # BLD AUTO: 108 10*3/MM3
PMV BLD: 7 FL
PROT UR STRIP-MCNC: NEGATIVE MG/DL
RBC # UR STRIP: ABNORMAL /UL
RBC, POC: 3
RDW, POC: 18.1
SP GR UR: 1.02 (ref 1–1.03)
UROBILINOGEN UR QL: NORMAL
WBC # BLD: 5.7 10*3/UL

## 2018-10-29 PROCEDURE — 99214 OFFICE O/P EST MOD 30 MIN: CPT | Performed by: NURSE PRACTITIONER

## 2018-10-29 PROCEDURE — 85027 COMPLETE CBC AUTOMATED: CPT | Performed by: NURSE PRACTITIONER

## 2018-10-29 PROCEDURE — 81003 URINALYSIS AUTO W/O SCOPE: CPT | Performed by: NURSE PRACTITIONER

## 2018-10-29 PROCEDURE — 36416 COLLJ CAPILLARY BLOOD SPEC: CPT | Performed by: NURSE PRACTITIONER

## 2018-10-29 NOTE — PROGRESS NOTES
Subjective   Nicolas Wilde is a 89 y.o. male low back pain for weeks, ongoing. Gradually worsening. Blood in urine for the past few weeks, gradually worsening. He is having flank pain bilaterally and lower abdominal pressure. 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 Overall, he is healthy but not feeling well today. Denies fever/chills. No n/v/d.   Taking tamoxifen, hx of mastectomy.     This is a new problem to this provider.     Blood in Urine   This is a new problem. The current episode started 1 to 4 weeks ago. The problem has been gradually worsening since onset. He describes the hematuria as gross hematuria. The hematuria occurs throughout his entire urinary stream. He reports clotting at the middle of his urine stream. His pain is at a severity of 0/10. He is experiencing no pain. He describes his urine color as tea colored. Irritative symptoms include frequency and urgency. Irritative symptoms do not include nocturia. Obstructive symptoms do not include dribbling, incomplete emptying, an intermittent stream, a slower stream, straining or a weak stream. Associated symptoms include abdominal pain and flank pain. Pertinent negatives include no chills, dysuria, facial swelling, fever, genital pain, hesitancy, inability to urinate, nausea or vomiting. His past medical history is significant for BPH. There is no history of kidney stones.   Weakness - Generalized   This is a new problem. The current episode started today. The problem occurs constantly. The problem has been unchanged. Associated symptoms include abdominal pain, arthralgias (back pain), urinary symptoms and weakness. Pertinent negatives include no anorexia, change in bowel habit, chest pain, chills, congestion, coughing, diaphoresis, fatigue, fever, headaches, joint swelling, myalgias, nausea, neck pain, numbness, rash, sore throat, swollen glands, vertigo, visual change or vomiting. Nothing aggravates the symptoms. He has tried nothing for the symptoms.  The treatment provided no relief.        The following portions of the patient's history were reviewed and updated as appropriate: allergies, current medications, past family history, past medical history, past social history, past surgical history and problem list.    Review of Systems   Constitutional: Negative for chills, diaphoresis, fatigue and fever.   HENT: Negative for congestion, facial swelling and sore throat.    Respiratory: Negative for cough.    Cardiovascular: Negative for chest pain.   Gastrointestinal: Positive for abdominal pain. Negative for anorexia, change in bowel habit, nausea and vomiting.   Genitourinary: Positive for flank pain, frequency, hematuria and urgency. Negative for dysuria, hesitancy, incomplete emptying and nocturia.   Musculoskeletal: Positive for arthralgias (back pain). Negative for joint swelling, myalgias and neck pain.   Skin: Negative for rash.   Neurological: Positive for weakness. Negative for vertigo, numbness and headaches.       Objective   Physical Exam   Constitutional: He is oriented to person, place, and time. He appears well-developed and well-nourished.   HENT:   Mouth/Throat: Oropharynx is clear and moist.   Eyes: Pupils are equal, round, and reactive to light. Conjunctivae are normal.   Cardiovascular: Normal rate, regular rhythm and normal heart sounds.  Exam reveals no gallop and no friction rub.    No murmur heard.  Pulmonary/Chest: Effort normal and breath sounds normal. No respiratory distress. He has no wheezes. He has no rales.   Abdominal: Soft. Bowel sounds are normal. He exhibits no distension. There is no tenderness.   Neurological: He is alert and oriented to person, place, and time.   Skin:   Pale     Psychiatric: He has a normal mood and affect.   Vitals reviewed.      Assessment/Plan   Nicolas was seen today for back pain.    Diagnoses and all orders for this visit:    Acute low back pain, unspecified back pain laterality, with sciatica presence  unspecified  -     POCT CBC  -     POCT urinalysis dipstick, automated    Dysuria  -     POCT CBC  -     POCT urinalysis dipstick, automated    Recommend ER follow up given pt symptoms and duration  Recent hgb decreased, from 12.2 to 9.5  Given duration of symptoms, feel ER eval warranted.  Patient agreeable, declines EMS transport.   Report called to Khoi Resendiz at pt request

## 2018-11-30 ENCOUNTER — TELEPHONE (OUTPATIENT)
Dept: FAMILY MEDICINE CLINIC | Facility: CLINIC | Age: 83
End: 2018-11-30

## 2018-11-30 NOTE — TELEPHONE ENCOUNTER
Beverley from [facility] called to get verbal order for PT, nursing ,occupatonal therapy   I already gave a verbal ok   #[phone]

## 2018-12-04 ENCOUNTER — HOSPITAL ENCOUNTER (EMERGENCY)
Facility: HOSPITAL | Age: 83
Discharge: HOME | End: 2018-12-04
Attending: EMERGENCY MEDICINE | Admitting: EMERGENCY MEDICINE

## 2018-12-04 ENCOUNTER — TELEPHONE (OUTPATIENT)
Dept: FAMILY MEDICINE CLINIC | Facility: CLINIC | Age: 83
End: 2018-12-04

## 2018-12-04 VITALS
DIASTOLIC BLOOD PRESSURE: 75 MMHG | SYSTOLIC BLOOD PRESSURE: 130 MMHG | BODY MASS INDEX: 18.9 KG/M2 | OXYGEN SATURATION: 95 % | RESPIRATION RATE: 18 BRPM | HEART RATE: 77 BPM | HEIGHT: 71 IN | WEIGHT: 135 LBS | TEMPERATURE: 97.7 F

## 2018-12-04 DIAGNOSIS — R33.8 ACUTE URINARY RETENTION: Primary | ICD-10-CM

## 2018-12-04 LAB
ANION GAP SERPL CALCULATED.3IONS-SCNC: 9.5 MMOL/L
BACTERIA UR QL AUTO: ABNORMAL /HPF
BASOPHILS # BLD AUTO: 0.04 10*3/MM3 (ref 0–0.2)
BASOPHILS NFR BLD AUTO: 0.5 % (ref 0–1.5)
BILIRUB UR QL STRIP: NEGATIVE
BUN BLD-MCNC: 13 MG/DL (ref 8–23)
BUN/CREAT SERPL: 18.3 (ref 7–25)
CALCIUM SPEC-SCNC: 8.6 MG/DL (ref 8.2–9.6)
CHLORIDE SERPL-SCNC: 100 MMOL/L (ref 98–107)
CLARITY UR: CLEAR
CO2 SERPL-SCNC: 25.5 MMOL/L (ref 22–29)
COLOR UR: YELLOW
CREAT BLD-MCNC: 0.71 MG/DL (ref 0.76–1.27)
DEPRECATED RDW RBC AUTO: 57.4 FL (ref 37–54)
EOSINOPHIL # BLD AUTO: 0.15 10*3/MM3 (ref 0–0.7)
EOSINOPHIL NFR BLD AUTO: 2 % (ref 0.3–6.2)
ERYTHROCYTE [DISTWIDTH] IN BLOOD BY AUTOMATED COUNT: 15.9 % (ref 11.5–14.5)
GFR SERPL CREATININE-BSD FRML MDRD: 104 ML/MIN/1.73
GLUCOSE BLD-MCNC: 97 MG/DL (ref 65–99)
GLUCOSE UR STRIP-MCNC: NEGATIVE MG/DL
HCT VFR BLD AUTO: 33.1 % (ref 40.4–52.2)
HGB BLD-MCNC: 10.1 G/DL (ref 13.7–17.6)
HGB UR QL STRIP.AUTO: ABNORMAL
HYALINE CASTS UR QL AUTO: ABNORMAL /LPF
IMM GRANULOCYTES # BLD: 0.06 10*3/MM3 (ref 0–0.03)
IMM GRANULOCYTES NFR BLD: 0.8 % (ref 0–0.5)
KETONES UR QL STRIP: ABNORMAL
LEUKOCYTE ESTERASE UR QL STRIP.AUTO: ABNORMAL
LYMPHOCYTES # BLD AUTO: 1.1 10*3/MM3 (ref 0.9–4.8)
LYMPHOCYTES NFR BLD AUTO: 14.5 % (ref 19.6–45.3)
MCH RBC QN AUTO: 30.4 PG (ref 27–32.7)
MCHC RBC AUTO-ENTMCNC: 30.5 G/DL (ref 32.6–36.4)
MCV RBC AUTO: 99.7 FL (ref 79.8–96.2)
MONOCYTES # BLD AUTO: 0.48 10*3/MM3 (ref 0.2–1.2)
MONOCYTES NFR BLD AUTO: 6.3 % (ref 5–12)
NEUTROPHILS # BLD AUTO: 5.75 10*3/MM3 (ref 1.9–8.1)
NEUTROPHILS NFR BLD AUTO: 75.9 % (ref 42.7–76)
NITRITE UR QL STRIP: NEGATIVE
PH UR STRIP.AUTO: 8 [PH] (ref 5–8)
PLATELET # BLD AUTO: 223 10*3/MM3 (ref 140–500)
PMV BLD AUTO: 9.1 FL (ref 6–12)
POTASSIUM BLD-SCNC: 3.8 MMOL/L (ref 3.5–5.2)
PROT UR QL STRIP: ABNORMAL
RBC # BLD AUTO: 3.32 10*6/MM3 (ref 4.6–6)
RBC # UR: ABNORMAL /HPF
REF LAB TEST METHOD: ABNORMAL
SODIUM BLD-SCNC: 135 MMOL/L (ref 136–145)
SP GR UR STRIP: 1.02 (ref 1–1.03)
SQUAMOUS #/AREA URNS HPF: ABNORMAL /HPF
UROBILINOGEN UR QL STRIP: ABNORMAL
WBC NRBC COR # BLD: 7.58 10*3/MM3 (ref 4.5–10.7)
WBC UR QL AUTO: ABNORMAL /HPF

## 2018-12-04 PROCEDURE — 81001 URINALYSIS AUTO W/SCOPE: CPT | Performed by: EMERGENCY MEDICINE

## 2018-12-04 PROCEDURE — 99284 EMERGENCY DEPT VISIT MOD MDM: CPT

## 2018-12-04 PROCEDURE — 80048 BASIC METABOLIC PNL TOTAL CA: CPT | Performed by: EMERGENCY MEDICINE

## 2018-12-04 PROCEDURE — 85025 COMPLETE CBC W/AUTO DIFF WBC: CPT | Performed by: EMERGENCY MEDICINE

## 2018-12-04 PROCEDURE — 87086 URINE CULTURE/COLONY COUNT: CPT | Performed by: EMERGENCY MEDICINE

## 2018-12-04 RX ORDER — SODIUM CHLORIDE 0.9 % (FLUSH) 0.9 %
10 SYRINGE (ML) INJECTION AS NEEDED
Status: DISCONTINUED | OUTPATIENT
Start: 2018-12-04 | End: 2018-12-04 | Stop reason: HOSPADM

## 2018-12-04 RX ORDER — ACETAMINOPHEN 500 MG
1000 TABLET ORAL ONCE
Status: COMPLETED | OUTPATIENT
Start: 2018-12-04 | End: 2018-12-04

## 2018-12-04 RX ADMIN — ACETAMINOPHEN 1000 MG: 500 TABLET, FILM COATED ORAL at 13:06

## 2018-12-04 NOTE — TELEPHONE ENCOUNTER
FAUZIA Machado pt son called :his father been in the ER 4 times this past month   He had bleeding and pain when they removed his catheter   He got hip replacement  From the hydrocodone  They requested consult with 1st urology dr bess he told the nursing home  to remove the cathter     He is now in [facility] since nov 4[facility]   He just want us to know  [phone]

## 2018-12-04 NOTE — ED NOTES
Pt presents with complaint of suprapubic pain, inability to urinate. Pt had jefferson cath removed last night, states has not urinated since. Pt has raised, firm, softball sized lump in suprapubic region. Bladder scan >999 ml. Pt complains of severe pain in that area. MD made aware. Orders to place jefferson.  11:05- Unable to pass 16f jefferson into bladder. 14F coude placed with minimal resistance. Clear yellow urine draining to bedside bag with small amount of sediment, blood colored.     Lyndon Jefferson, RN  12/04/18 4627

## 2018-12-04 NOTE — ED PROVIDER NOTES
EMERGENCY DEPARTMENT ENCOUNTER    Room Number:  20/20  Date seen:  12/4/2018  Time seen: 11:15 AM  PCP: Evans Porter MD  Historian: patient  Urologist- Dr. Copeland    HPI:  Chief Complaint: urinary retention    Context: Nicolas Wilde is a 89 y.o. male who presents to the ED c/o trouble urinating since his jefferson catheter was removed last night. Pt states that he has not urinated since the catheter was removed. Pt states that he then developed abd pain, abd distention and associated SOA. Pt states that two different people at the NH attempted to replace the pt's jefferson catheter, which is why he was sent to the ED. Pt states that he is unsure why the jefferson catheter originally placed but has no complaints after the jefferson catheter was placed in the ED. Pt complains of a BUE tremor secondary to pain but denies CP, fever or chills.    Quality: trouble urinating  Intensity/Severity: severe  Duration: one day  Onset quality: gradual  Timing: constant  Progression: resolved after catheter placement  Aggravating Factors: none  Alleviating Factors: none  Previous Episodes: Pt has a history of urinary retention  Treatment before arrival: Pt states that his catheter was removed last night  Associated Symptoms: abd pain, abd distention, SOA, tremor    PAST MEDICAL HISTORY  Active Ambulatory Problems     Diagnosis Date Noted   • Essential hypertension 03/16/2016   • Palpitations 03/16/2016   • Anemia due to vitamin B12 deficiency 03/16/2016   • Gynecomastia, male 08/23/2017   • Malignant neoplasm of central portion of right male breast (CMS/HCC) 09/15/2017   • Lymphadenopathy 03/28/2018   • Hx of breast cancer 03/28/2018   • Medicare annual wellness visit, initial 09/14/2018     Resolved Ambulatory Problems     Diagnosis Date Noted   • Functional diarrhea 05/19/2017     Past Medical History:   Diagnosis Date   • Arthritis    • BPH (benign prostatic hyperplasia)    • Breast cancer (CMS/HCC)    • Chronic low back pain    •  Hard of hearing    • Lymph node enlargement    • Melanoma (CMS/HCC)    • Prostate cancer (CMS/HCC)    • Seasonal allergies          PAST SURGICAL HISTORY  Past Surgical History:   Procedure Laterality Date   • HERNIA REPAIR      LEFT AND RIGHT   • LAPAROSCOPIC CHOLECYSTECTOMY     • LUMBAR DECOMPRESSION     • SKIN CANCER EXCISION      SEVERAL MELANOMA REMOVED FROM BACK         FAMILY HISTORY  Family History   Problem Relation Age of Onset   • Prostate cancer Father    • Melanoma Father    • Heart disease Mother    • Malig Hyperthermia Neg Hx          SOCIAL HISTORY  Social History     Socioeconomic History   • Marital status:      Spouse name: Natali   • Number of children: Not on file   • Years of education: College   • Highest education level: Not on file   Social Needs   • Financial resource strain: Not on file   • Food insecurity - worry: Not on file   • Food insecurity - inability: Not on file   • Transportation needs - medical: Not on file   • Transportation needs - non-medical: Not on file   Occupational History   • Occupation: Research criminologist     Employer: RETIRED   Tobacco Use   • Smoking status: Former Smoker     Years: 5.00     Types: Cigarettes   • Smokeless tobacco: Never Used   • Tobacco comment: ONLY FOR COUPLE YEARS QUIT EARLY 50'S   Substance and Sexual Activity   • Alcohol use: Yes     Comment: occasional   • Drug use: No   • Sexual activity: Not on file   Other Topics Concern   • Not on file   Social History Narrative   • Not on file         ALLERGIES  Penicillins and Hydrocodone        REVIEW OF SYSTEMS  Review of Systems   Constitutional: Negative for fever.   HENT: Negative for sore throat.    Respiratory: Positive for shortness of breath.    Cardiovascular: Negative for chest pain.   Gastrointestinal: Positive for abdominal distention and abdominal pain.   Endocrine: Negative for polyuria.   Genitourinary: Positive for difficulty urinating. Negative for dysuria.   Musculoskeletal:  Negative for neck pain.   Skin: Negative for rash.   Neurological: Positive for tremors (BUE, associated with pain). Negative for headaches.   All other systems reviewed and are negative.         PHYSICAL EXAM  ED Triage Vitals   Temp Heart Rate Resp BP SpO2   12/04/18 1019 12/04/18 1019 12/04/18 1019 12/04/18 1019 12/04/18 1019   98.3 °F (36.8 °C) 76 18 143/98 95 %      Temp src Heart Rate Source Patient Position BP Location FiO2 (%)   12/04/18 1019 12/04/18 1019 12/04/18 1019 12/04/18 1105 --   Tympanic Monitor Sitting Right arm        GENERAL: not distressed  HENT: nares patent  EYES: no scleral icterus  CV: regular rhythm, regular rate, systolic murmur at RUSB  RESPIRATORY: normal effort, CTAB  ABDOMEN: soft, suprapubic tenderness without rebound or guarding  : jefferson catheter in place that is draining yellow urine with sediment  MUSCULOSKELETAL: no deformity, 1+ pedal edema bilaterally (L>R)  NEURO: alert, GOEL, FC, bilateral resting tremor to hands  SKIN: warm, dry    Vital signs and nursing notes reviewed.      LAB RESULTS  Recent Results (from the past 24 hour(s))   Basic Metabolic Panel    Collection Time: 12/04/18 11:36 AM   Result Value Ref Range    Glucose 97 65 - 99 mg/dL    BUN 13 8 - 23 mg/dL    Creatinine 0.71 (L) 0.76 - 1.27 mg/dL    Sodium 135 (L) 136 - 145 mmol/L    Potassium 3.8 3.5 - 5.2 mmol/L    Chloride 100 98 - 107 mmol/L    CO2 25.5 22.0 - 29.0 mmol/L    Calcium 8.6 8.2 - 9.6 mg/dL    eGFR Non African Amer 104 >60 mL/min/1.73    BUN/Creatinine Ratio 18.3 7.0 - 25.0    Anion Gap 9.5 mmol/L   CBC Auto Differential    Collection Time: 12/04/18 11:36 AM   Result Value Ref Range    WBC 7.58 4.50 - 10.70 10*3/mm3    RBC 3.32 (L) 4.60 - 6.00 10*6/mm3    Hemoglobin 10.1 (L) 13.7 - 17.6 g/dL    Hematocrit 33.1 (L) 40.4 - 52.2 %    MCV 99.7 (H) 79.8 - 96.2 fL    MCH 30.4 27.0 - 32.7 pg    MCHC 30.5 (L) 32.6 - 36.4 g/dL    RDW 15.9 (H) 11.5 - 14.5 %    RDW-SD 57.4 (H) 37.0 - 54.0 fl    MPV 9.1 6.0 -  12.0 fL    Platelets 223 140 - 500 10*3/mm3    Neutrophil % 75.9 42.7 - 76.0 %    Lymphocyte % 14.5 (L) 19.6 - 45.3 %    Monocyte % 6.3 5.0 - 12.0 %    Eosinophil % 2.0 0.3 - 6.2 %    Basophil % 0.5 0.0 - 1.5 %    Immature Grans % 0.8 (H) 0.0 - 0.5 %    Neutrophils, Absolute 5.75 1.90 - 8.10 10*3/mm3    Lymphocytes, Absolute 1.10 0.90 - 4.80 10*3/mm3    Monocytes, Absolute 0.48 0.20 - 1.20 10*3/mm3    Eosinophils, Absolute 0.15 0.00 - 0.70 10*3/mm3    Basophils, Absolute 0.04 0.00 - 0.20 10*3/mm3    Immature Grans, Absolute 0.06 (H) 0.00 - 0.03 10*3/mm3   Urinalysis With Culture If Indicated - Urine, Catheter    Collection Time: 12/04/18 11:37 AM   Result Value Ref Range    Color, UA Yellow Yellow, Straw    Appearance, UA Clear Clear    pH, UA 8.0 5.0 - 8.0    Specific Gravity, UA 1.016 1.005 - 1.030    Glucose, UA Negative Negative    Ketones, UA 15 mg/dL (1+) (A) Negative    Bilirubin, UA Negative Negative    Blood, UA Large (3+) (A) Negative    Protein, UA Trace (A) Negative    Leuk Esterase, UA Small (1+) (A) Negative    Nitrite, UA Negative Negative    Urobilinogen, UA 0.2 E.U./dL 0.2 - 1.0 E.U./dL   Urinalysis, Microscopic Only - Urine, Catheter    Collection Time: 12/04/18 11:37 AM   Result Value Ref Range    RBC, UA Too Numerous to Count (A) None Seen, 0-2 /HPF    WBC, UA 6-12 (A) None Seen, 0-2 /HPF    Bacteria, UA None Seen None Seen /HPF    Squamous Epithelial Cells, UA 0-2 None Seen, 0-2 /HPF    Hyaline Casts, UA None Seen None Seen /LPF    Methodology Automated Microscopy        Ordered the above labs and reviewed the results.    PROCEDURES  Procedures      MEDICATIONS GIVEN IN ER  Medications   sodium chloride 0.9 % flush 10 mL (not administered)   acetaminophen (TYLENOL) tablet 1,000 mg (not administered)       PROGRESS AND CONSULTS     1121- Discussed the plan to order lab work for further evaluation of the pt's symptoms. Pt understands and agrees with the plan, all questions answered.    1122-  Ordered lab work for further evaluation.    1234- Rechecked pt. Pt is resting comfortably and states that his abd pain has improved. Pt requested Tylenol for pain. Notified pt of his lab results, including his BUN and Creatinine. Notified pt that I am going to order a urine culture and that he will receive a call if his urine culture is abnormal. Discussed the plan to discharge the pt home with instructions to follow up with urology. Pt understands and agrees with the plan, all questions answered.    MEDICAL DECISION MAKING      MDM  Number of Diagnoses or Management Options  Acute urinary retention:   Diagnosis management comments: Pt with urinary retention. Urine does not appear infected. Cuture pending. Pain improved replacing Méndez. Cr stable.        Amount and/or Complexity of Data Reviewed  Clinical lab tests: ordered and reviewed (Creatinine=0.71)  Decide to obtain previous medical records or to obtain history from someone other than the patient: yes  Review and summarize past medical records: yes (Pt was admitted from 11/14 until 11/17/18 at [place] for NAM and acute urinary retention secdonary to BPH.)    Patient Progress  Patient progress: improved         DIAGNOSIS  Final diagnoses:   Acute urinary retention         DISPOSITION  DISCHARGE    Patient discharged in stable condition.    Reviewed implications of results, diagnosis, meds, responsibility to follow up, warning signs and symptoms of possible worsening, potential complications and reasons to return to ED.    Patient/Family voiced understanding of above instructions.    Discussed plan for discharge, as there is no emergent indication for admission. Patient referred to primary care provider for BP management due to today's BP. Pt/family is agreeable and understands need for follow up and repeat testing.  Pt is aware that discharge does not mean that nothing is wrong but it indicates no emergency is present that requires admission and they must  continue care with follow-up as given below or physician of their choice.     FOLLOW-UP  Blu Copeland MD  [address] [address]  [phone]    Schedule an appointment as soon as possible for a visit in 1 week  for trial of spontaneous urination (take your Méndez catheter out)         Medication List      No changes were made to your prescriptions during this visit.         Latest Documented Vital Signs:  As of 12:39 PM  BP- 120/63 HR- 77 Temp- 97.7 °F (36.5 °C) (Oral) O2 sat- 95%        --  Documentation assistance provided by joshua Stephens for Dr. Ayo MD.  Information recorded by the scribe was done at my direction and has been verified and validated by me.     Yulia Stephens  12/04/18 8829       Evans Rollins II, MD  12/04/18 1461

## 2018-12-06 LAB — BACTERIA SPEC AEROBE CULT: NO GROWTH

## 2018-12-12 ENCOUNTER — TELEPHONE (OUTPATIENT)
Dept: FAMILY MEDICINE CLINIC | Facility: CLINIC | Age: 83
End: 2018-12-12

## 2018-12-12 NOTE — TELEPHONE ENCOUNTER
Aggie from [facility] [phone] called he is being D/C from [facility] 12/14/18 and will need orders for PT/OT and skilled nursing thanks

## 2018-12-17 ENCOUNTER — TELEPHONE (OUTPATIENT)
Dept: FAMILY MEDICINE CLINIC | Facility: CLINIC | Age: 83
End: 2018-12-17

## 2018-12-17 ENCOUNTER — APPOINTMENT (OUTPATIENT)
Dept: CT IMAGING | Facility: HOSPITAL | Age: 83
End: 2018-12-17

## 2018-12-17 ENCOUNTER — TELEPHONE (OUTPATIENT)
Dept: GENERAL RADIOLOGY | Facility: HOSPITAL | Age: 83
End: 2018-12-17

## 2018-12-17 ENCOUNTER — DOCUMENTATION (OUTPATIENT)
Dept: ONCOLOGY | Facility: CLINIC | Age: 83
End: 2018-12-17

## 2018-12-17 ENCOUNTER — HOSPITAL ENCOUNTER (EMERGENCY)
Facility: HOSPITAL | Age: 83
Discharge: HOME | End: 2018-12-17
Attending: EMERGENCY MEDICINE | Admitting: EMERGENCY MEDICINE

## 2018-12-17 ENCOUNTER — TELEPHONE (OUTPATIENT)
Dept: ONCOLOGY | Facility: HOSPITAL | Age: 83
End: 2018-12-17

## 2018-12-17 VITALS
DIASTOLIC BLOOD PRESSURE: 80 MMHG | SYSTOLIC BLOOD PRESSURE: 135 MMHG | HEART RATE: 90 BPM | WEIGHT: 160.1 LBS | HEIGHT: 71 IN | RESPIRATION RATE: 12 BRPM | OXYGEN SATURATION: 100 % | BODY MASS INDEX: 22.41 KG/M2 | TEMPERATURE: 98.6 F

## 2018-12-17 DIAGNOSIS — M89.9 BONE LESION: ICD-10-CM

## 2018-12-17 DIAGNOSIS — T83.9XXA COMPLICATION OF FOLEY CATHETER, INITIAL ENCOUNTER (HCC): Primary | ICD-10-CM

## 2018-12-17 LAB
ALBUMIN SERPL-MCNC: 3.3 G/DL (ref 3.5–5.2)
ALBUMIN/GLOB SERPL: 1.3 G/DL
ALP SERPL-CCNC: 425 U/L (ref 39–117)
ALT SERPL W P-5'-P-CCNC: 8 U/L (ref 1–41)
ANION GAP SERPL CALCULATED.3IONS-SCNC: 11.4 MMOL/L
AST SERPL-CCNC: 15 U/L (ref 1–40)
BACTERIA UR QL AUTO: ABNORMAL /HPF
BASOPHILS # BLD AUTO: 0.02 10*3/MM3 (ref 0–0.2)
BASOPHILS NFR BLD AUTO: 0.4 % (ref 0–1.5)
BILIRUB SERPL-MCNC: 0.4 MG/DL (ref 0.1–1.2)
BILIRUB UR QL STRIP: NEGATIVE
BUN BLD-MCNC: 18 MG/DL (ref 8–23)
BUN/CREAT SERPL: 22.5 (ref 7–25)
CALCIUM SPEC-SCNC: 8.7 MG/DL (ref 8.2–9.6)
CHLORIDE SERPL-SCNC: 100 MMOL/L (ref 98–107)
CLARITY UR: CLEAR
CO2 SERPL-SCNC: 24.6 MMOL/L (ref 22–29)
COLOR UR: YELLOW
CREAT BLD-MCNC: 0.8 MG/DL (ref 0.76–1.27)
DEPRECATED RDW RBC AUTO: 55.2 FL (ref 37–54)
EOSINOPHIL # BLD AUTO: 0.08 10*3/MM3 (ref 0–0.7)
EOSINOPHIL NFR BLD AUTO: 1.4 % (ref 0.3–6.2)
ERYTHROCYTE [DISTWIDTH] IN BLOOD BY AUTOMATED COUNT: 15.9 % (ref 11.5–14.5)
GFR SERPL CREATININE-BSD FRML MDRD: 90 ML/MIN/1.73
GLOBULIN UR ELPH-MCNC: 2.6 GM/DL
GLUCOSE BLD-MCNC: 105 MG/DL (ref 65–99)
GLUCOSE UR STRIP-MCNC: NEGATIVE MG/DL
HCT VFR BLD AUTO: 29.4 % (ref 40.4–52.2)
HGB BLD-MCNC: 9.5 G/DL (ref 13.7–17.6)
HGB UR QL STRIP.AUTO: ABNORMAL
HOLD SPECIMEN: NORMAL
HOLD SPECIMEN: NORMAL
HYALINE CASTS UR QL AUTO: ABNORMAL /LPF
IMM GRANULOCYTES # BLD: 0.04 10*3/MM3 (ref 0–0.03)
IMM GRANULOCYTES NFR BLD: 0.7 % (ref 0–0.5)
KETONES UR QL STRIP: NEGATIVE
LEUKOCYTE ESTERASE UR QL STRIP.AUTO: ABNORMAL
LYMPHOCYTES # BLD AUTO: 1.02 10*3/MM3 (ref 0.9–4.8)
LYMPHOCYTES NFR BLD AUTO: 17.9 % (ref 19.6–45.3)
MCH RBC QN AUTO: 30.7 PG (ref 27–32.7)
MCHC RBC AUTO-ENTMCNC: 32.3 G/DL (ref 32.6–36.4)
MCV RBC AUTO: 95.1 FL (ref 79.8–96.2)
MONOCYTES # BLD AUTO: 0.47 10*3/MM3 (ref 0.2–1.2)
MONOCYTES NFR BLD AUTO: 8.2 % (ref 5–12)
NEUTROPHILS # BLD AUTO: 4.12 10*3/MM3 (ref 1.9–8.1)
NEUTROPHILS NFR BLD AUTO: 72.1 % (ref 42.7–76)
NITRITE UR QL STRIP: NEGATIVE
PH UR STRIP.AUTO: <=5 [PH] (ref 5–8)
PLATELET # BLD AUTO: 254 10*3/MM3 (ref 140–500)
PMV BLD AUTO: 8.7 FL (ref 6–12)
POTASSIUM BLD-SCNC: 4.1 MMOL/L (ref 3.5–5.2)
PROT SERPL-MCNC: 5.9 G/DL (ref 6–8.5)
PROT UR QL STRIP: ABNORMAL
RBC # BLD AUTO: 3.09 10*6/MM3 (ref 4.6–6)
RBC # UR: ABNORMAL /HPF
REF LAB TEST METHOD: ABNORMAL
SODIUM BLD-SCNC: 136 MMOL/L (ref 136–145)
SP GR UR STRIP: 1.01 (ref 1–1.03)
SQUAMOUS #/AREA URNS HPF: ABNORMAL /HPF
UROBILINOGEN UR QL STRIP: ABNORMAL
WBC NRBC COR # BLD: 5.71 10*3/MM3 (ref 4.5–10.7)
WBC UR QL AUTO: ABNORMAL /HPF
WHOLE BLOOD HOLD SPECIMEN: NORMAL
WHOLE BLOOD HOLD SPECIMEN: NORMAL

## 2018-12-17 PROCEDURE — 85025 COMPLETE CBC W/AUTO DIFF WBC: CPT | Performed by: PHYSICIAN ASSISTANT

## 2018-12-17 PROCEDURE — 80053 COMPREHEN METABOLIC PANEL: CPT | Performed by: PHYSICIAN ASSISTANT

## 2018-12-17 PROCEDURE — 81001 URINALYSIS AUTO W/SCOPE: CPT | Performed by: PHYSICIAN ASSISTANT

## 2018-12-17 PROCEDURE — 74176 CT ABD & PELVIS W/O CONTRAST: CPT

## 2018-12-17 PROCEDURE — 99284 EMERGENCY DEPT VISIT MOD MDM: CPT

## 2018-12-17 RX ORDER — ACETAMINOPHEN 325 MG/1
650 TABLET ORAL ONCE
Status: COMPLETED | OUTPATIENT
Start: 2018-12-17 | End: 2018-12-17

## 2018-12-17 RX ADMIN — ACETAMINOPHEN 650 MG: 325 TABLET, FILM COATED ORAL at 13:19

## 2018-12-17 NOTE — DISCHARGE INSTRUCTIONS
Dr. Todd's office will call you to schedule within 1-2 weeks. If you do not hear from them this week, call them.  Follow up with Dr. Powell.  Return to the ER for fever, vomiting, abdominal pain, chest pain, shortness of breath, fever, trouble urinating, any concerns.

## 2018-12-17 NOTE — ED NOTES
Pt c/o 7/10 pain at catheter insertion site, uretheral meatus.  RN assessed site; no redness, swelling, or drainage noted; catheter still actively draining dark yellow urine.  Pt positioned for comfort, PA notified.     Luke Chung, RN  12/17/18 1495

## 2018-12-17 NOTE — ED NOTES
Pt reports relief of discomfort after changing jefferson catheter with evacuation of 1200ml urine     Luke Chung RN  12/17/18 1847

## 2018-12-17 NOTE — ED PROVIDER NOTES
"EMERGENCY DEPARTMENT ENCOUNTER    Room Number:  25/25  Date seen:  12/17/2018  Time seen: 11:24 AM  PCP: Evans Porter MD   Urologist- Dr Powell  Oncologist- Dr Todd  History provided by- patient    HPI:  Chief complaint: abdominal pain  Context:Nicolas Wilde is a 89 y.o. male who lives at home with his spouse and has hx of BPH, breast cancer (currently on tamoxifen), and prostate cancer. He states that he has had a jefferson catheter in place \"for a while\" and that it was last changed about 2 weeks ago. He presents to the ED with c/o lower abd pain that started yesterday. With it, has also had lower abd distention and has noticed that his jefferson catheter has not been draining urine properly. He denies N/V/D, fever, chills, chest pain, and dyspnea. He notes that he has been constipated and had a small bowel movement yesterday. He also states that his abd pain has now improved but is still present after his jefferson catheter was replaced in ED by ED RN. Pt has no other complaints at this time.       Onset: gradual  Location: lower abdomen  Radiation: none  Duration: started yesterday  Timing: constant  Character: pain  Aggravating Factors: none mentioned  Alleviating Factors: improved after jefferson catheter was replaced in ED  Severity: moderate    MEDICAL RECORD REVIEW  Pt has hx of breast cancer and prostate cancer. 3 months ago, alk phos was 158. 13 days ago, hgb was 10.1. 12/04/2018 - ER visit for urinary retention, jefferson catheter inserted.     ALLERGIES  Penicillins and Hydrocodone    PAST MEDICAL HISTORY  Active Ambulatory Problems     Diagnosis Date Noted   • Essential hypertension 03/16/2016   • Palpitations 03/16/2016   • Anemia due to vitamin B12 deficiency 03/16/2016   • Gynecomastia, male 08/23/2017   • Malignant neoplasm of central portion of right male breast (CMS/HCC) 09/15/2017   • Lymphadenopathy 03/28/2018   • Hx of breast cancer 03/28/2018   • Medicare annual wellness visit, initial 09/14/2018 "     Resolved Ambulatory Problems     Diagnosis Date Noted   • Functional diarrhea 05/19/2017     Past Medical History:   Diagnosis Date   • Arthritis    • BPH (benign prostatic hyperplasia)    • Breast cancer (CMS/HCC)    • Chronic low back pain    • Hard of hearing    • Lymph node enlargement    • Melanoma (CMS/HCC)    • Prostate cancer (CMS/HCC)    • Seasonal allergies        PAST SURGICAL HISTORY  Past Surgical History:   Procedure Laterality Date   • HERNIA REPAIR      LEFT AND RIGHT   • LAPAROSCOPIC CHOLECYSTECTOMY     • LUMBAR DECOMPRESSION     • SKIN CANCER EXCISION      SEVERAL MELANOMA REMOVED FROM BACK       FAMILY HISTORY  Family History   Problem Relation Age of Onset   • Prostate cancer Father    • Melanoma Father    • Heart disease Mother    • Malig Hyperthermia Neg Hx        SOCIAL HISTORY  Social History     Socioeconomic History   • Marital status:      Spouse name: Natali   • Number of children: Not on file   • Years of education: College   • Highest education level: Not on file   Social Needs   • Financial resource strain: Not on file   • Food insecurity - worry: Not on file   • Food insecurity - inability: Not on file   • Transportation needs - medical: Not on file   • Transportation needs - non-medical: Not on file   Occupational History   • Occupation: Research criminologist     Employer: RETIRED   Tobacco Use   • Smoking status: Former Smoker     Years: 5.00     Types: Cigarettes   • Smokeless tobacco: Never Used   • Tobacco comment: ONLY FOR COUPLE YEARS QUIT EARLY 50'S   Substance and Sexual Activity   • Alcohol use: Yes     Comment: occasional   • Drug use: No   • Sexual activity: Not on file   Other Topics Concern   • Not on file   Social History Narrative   • Not on file       REVIEW OF SYSTEMS  Review of Systems   Constitutional: Negative for chills and fever.   HENT: Negative.  Negative for sore throat.    Eyes: Negative.    Respiratory: Negative for shortness of breath.     Cardiovascular: Negative for chest pain.   Gastrointestinal: Positive for abdominal distention (lower abd distension), abdominal pain (lower abd pain) and constipation. Negative for diarrhea, nausea and vomiting.   Genitourinary: Negative for flank pain.        Méndez catheter not draining urine properly    Musculoskeletal: Negative.  Negative for back pain.   Skin: Negative.  Negative for rash.   Neurological: Negative.  Negative for dizziness.   Psychiatric/Behavioral: Negative.  The patient is not nervous/anxious.        PHYSICAL EXAM  ED Triage Vitals [12/17/18 1019]   Temp Heart Rate Resp BP SpO2   98.6 °F (37 °C) 75 16 127/74 98 % WNL      Temp src Heart Rate Source Patient Position BP Location FiO2 (%)   Tympanic Monitor -- -- --     Physical Exam   Constitutional: He is oriented to person, place, and time. No distress.   Frail and elderly appearing   HENT:   Head: Normocephalic and atraumatic.   Right Ear: External ear normal.   Left Ear: External ear normal.   Nose: Nose normal.   Eyes: Conjunctivae are normal.   Neck: Normal range of motion.   Cardiovascular: Normal rate and regular rhythm.   Pulmonary/Chest: Effort normal and breath sounds normal. No respiratory distress. He has no wheezes. He has no rhonchi. He has no rales.   Abdominal:   Normal bowel sounds  Soft  Nontender  Nondistended  No rebound, guarding, or rigidity  No appreciable organomegaly   Genitourinary:   Genitourinary Comments: Méndez catheter in place and is draining clear yellow urine; no skin rash, erythema, or edema to  region    Musculoskeletal: Normal range of motion.   Neurological: He is alert and oriented to person, place, and time.   Skin: Skin is warm and dry.   Psychiatric: Affect normal.   Nursing note and vitals reviewed.      LAB RESULTS  Recent Results (from the past 24 hour(s))   Comprehensive Metabolic Panel    Collection Time: 12/17/18 11:47 AM   Result Value Ref Range    Glucose 105 (H) 65 - 99 mg/dL    BUN 18 8 -  23 mg/dL    Creatinine 0.80 0.76 - 1.27 mg/dL    Sodium 136 136 - 145 mmol/L    Potassium 4.1 3.5 - 5.2 mmol/L    Chloride 100 98 - 107 mmol/L    CO2 24.6 22.0 - 29.0 mmol/L    Calcium 8.7 8.2 - 9.6 mg/dL    Total Protein 5.9 (L) 6.0 - 8.5 g/dL    Albumin 3.30 (L) 3.50 - 5.20 g/dL    ALT (SGPT) 8 1 - 41 U/L    AST (SGOT) 15 1 - 40 U/L    Alkaline Phosphatase 425 (H) 39 - 117 U/L    Total Bilirubin 0.4 0.1 - 1.2 mg/dL    eGFR Non African Amer 90 >60 mL/min/1.73    Globulin 2.6 gm/dL    A/G Ratio 1.3 g/dL    BUN/Creatinine Ratio 22.5 7.0 - 25.0    Anion Gap 11.4 mmol/L   CBC Auto Differential    Collection Time: 12/17/18 11:47 AM   Result Value Ref Range    WBC 5.71 4.50 - 10.70 10*3/mm3    RBC 3.09 (L) 4.60 - 6.00 10*6/mm3    Hemoglobin 9.5 (L) 13.7 - 17.6 g/dL    Hematocrit 29.4 (L) 40.4 - 52.2 %    MCV 95.1 79.8 - 96.2 fL    MCH 30.7 27.0 - 32.7 pg    MCHC 32.3 (L) 32.6 - 36.4 g/dL    RDW 15.9 (H) 11.5 - 14.5 %    RDW-SD 55.2 (H) 37.0 - 54.0 fl    MPV 8.7 6.0 - 12.0 fL    Platelets 254 140 - 500 10*3/mm3    Neutrophil % 72.1 42.7 - 76.0 %    Lymphocyte % 17.9 (L) 19.6 - 45.3 %    Monocyte % 8.2 5.0 - 12.0 %    Eosinophil % 1.4 0.3 - 6.2 %    Basophil % 0.4 0.0 - 1.5 %    Immature Grans % 0.7 (H) 0.0 - 0.5 %    Neutrophils, Absolute 4.12 1.90 - 8.10 10*3/mm3    Lymphocytes, Absolute 1.02 0.90 - 4.80 10*3/mm3    Monocytes, Absolute 0.47 0.20 - 1.20 10*3/mm3    Eosinophils, Absolute 0.08 0.00 - 0.70 10*3/mm3    Basophils, Absolute 0.02 0.00 - 0.20 10*3/mm3    Immature Grans, Absolute 0.04 (H) 0.00 - 0.03 10*3/mm3   Light Blue Top    Collection Time: 12/17/18 11:47 AM   Result Value Ref Range    Extra Tube hold for add-on    Green Top (Gel)    Collection Time: 12/17/18 11:47 AM   Result Value Ref Range    Extra Tube Hold for add-ons.    Lavender Top    Collection Time: 12/17/18 11:47 AM   Result Value Ref Range    Extra Tube hold for add-on    Gold Top - SST    Collection Time: 12/17/18 11:47 AM   Result Value Ref  Range    Extra Tube Hold for add-ons.    Urinalysis With Microscopic If Indicated (No Culture) - Urine, Catheter    Collection Time: 12/17/18 11:49 AM   Result Value Ref Range    Color, UA Yellow Yellow, Straw    Appearance, UA Clear Clear    pH, UA <=5.0 5.0 - 8.0    Specific Gravity, UA 1.014 1.005 - 1.030    Glucose, UA Negative Negative    Ketones, UA Negative Negative    Bilirubin, UA Negative Negative    Blood, UA Small (1+) (A) Negative    Protein, UA Trace (A) Negative    Leuk Esterase, UA Trace (A) Negative    Nitrite, UA Negative Negative    Urobilinogen, UA 0.2 E.U./dL 0.2 - 1.0 E.U./dL   Urinalysis, Microscopic Only - Urine, Catheter    Collection Time: 12/17/18 11:49 AM   Result Value Ref Range    RBC, UA 6-12 (A) None Seen, 0-2 /HPF    WBC, UA 0-2 None Seen, 0-2 /HPF    Bacteria, UA None Seen None Seen /HPF    Squamous Epithelial Cells, UA 0-2 None Seen, 0-2 /HPF    Hyaline Casts, UA 0-2 None Seen /LPF    Methodology Automated Microscopy        I ordered the above labs and reviewed the results    RADIOLOGY  CT Abdomen Pelvis Without Contrast (Preliminary result)   Result time 12/17/18 14:02:46   Preliminary result by Maddy Bills MD (12/17/18 14:02:46)                Impression:    The exam is limited by absence of contrast and streak  artifact from patient's left hip prosthesis.  1. Urinary bladder is decompressed with a Méndez catheter. The prostate  gland is enlarged and indents the bladder base. No renal calculi or  hydronephrosis.  2. Findings suggestive of constipation. Mild proctitis is suggested.  3. Mild air and fluid distention of the distal small bowel loops may  represent a ileus.  4. Bilateral small layering pleural effusions left greater than right  with basilar subsegmental atelectasis.  5. Mixed lucent and sclerotic lesions within the pelvic bones most  consistent with metastatic disease.     These findings were discussed with Mellissa Starkey by telephone.     Radiation dose reduction  techniques were utilized, including automated  exposure control and exposure modulation based on body size.                  Narrative:    CT ABDOMEN AND PELVIS WITHOUT CONTRAST     HISTORY: Urinary retention and constipation.     TECHNIQUE: Axial CT images of the abdomen and pelvis were obtained  without administration of intravenous contrast. The patient was not  given oral contrast. Coronal and sagittal reformats were obtained.     COMPARISON: CT abdomen and pelvis from 11/18/2013.     FINDINGS: Bilateral small layering pleural effusions are present, left  greater than right. Subsegmental atelectatic change is seen within the  left lower lobe. There is a noncalcified unchanged 9 mm right lower lobe  nodule. Small pericardial effusion is seen. Noncontrast attenuation of  the liver is normal. The gallbladder is surgically absent. The spleen is  unremarkable. Diffuse fatty infiltration within the pancreas is seen.  Mild atherosclerotic change is seen within the abdominal aorta and its  branches particularly the splenic artery. Infrarenal abdominal aortic  ectasia measuring up to 2.6 cm. Dilatation of bilateral common iliac  arteries measuring 1.4 cm on the right and 1.6 cm on the left.     Bilateral adrenal gland and kidneys are normal. No renal calculi or  hydronephrosis.     Evaluation of the pelvis is limited secondary to streak artifact from  patient's left hip prosthesis. The urinary bladder is minimally  distended with presence of a urinary catheter. The prostate gland is  enlarged and indents the bladder base. The small and large bowel loops  demonstrate normal caliber. Moderate to large amounts of stool are seen  throughout the colon. Mild diffuse rectal wall thickening may represent  mild proctitis. Sigmoid diverticulosis is present. A few of the distal  small bowel loops demonstrate air-fluid levels measuring up to 2.5 cm.  No evidence of bowel obstruction. No pathological retroperitoneal  lymphadenopathy.  No ascites. There is diffuse heterogeneity to the  visualized bones particularly within the pelvic bones with there are  multiple new lucent lesions as well as areas of sclerosis. There is also  diffuse heterogeneity within the lumbar vertebral bodies. The findings  are most concerning for metastatic disease.                       I ordered the above noted radiological studies and reviewed the images on the PACS system.  Spoke with Dr. Bills (radiologist) regarding CT abd/pel scan results    MEDICATIONS GIVEN IN ER  Medications   acetaminophen (TYLENOL) tablet 650 mg (650 mg Oral Given 12/17/18 1319)         PROCEDURES  Procedures      PROGRESS AND CONSULTS    Progress Notes:       1126- Per RN, pt's jefferson catheter was not draining urine properly and bladder scan showed >999ml urine in bladder. After RN replaced pt's jefferson catheter, there was output of approximately 1200ml urine in drainage bag.     1138- Ordered CT abd/pel, UA, and blood work for further evaluation.     1234- Obtained CT abd/pel results-> metastatic disease of bones (lytic and sclerotic lesions; no recent previous imaging for comparison), enlarged prostate, mild constipation.     1253- Per RN, pt has pain at jefferson catheter insertion site and is requesting tylenol. Ordered tylenol for pain.     1353- Son named Carter (MAXX) called to be updated on pt's ED workup. Discussed with son over the phone about all pertinent results including normal WBC count, hgb of 9.5 which has decreased from 13 days ago when it was 10.1, elevated alk phos of 425, unremarkable UA, normal renal function, and otherwise stable labs. Informed son that CT abd/pel is concerning for metastatic disease of the bones (lytic and sclerotic lesions), enlarged prostate, and mild constipation. Notified son that because pt's jefferson catheter was not draining urine properly and pt was consequently retaining urine, we replaced the jefferson catheter which is now actively draining urine. Reviewed  with son about plan to discuss case with Dr Todd (oncologist) in order to determine further course of care. Son verbalizes understanding and agreement with plan.     1406- Sent call out to Dr Todd (oncologist) for consult.     1408- Family is now at pt's bedside. Rechecked pt. He is resting comfortably and is in no acute distress. Discussed with pt and family about all pertinent results including normal WBC count, hgb of 9.5 which has decreased from 13 days ago when it was 10.1, elevated alk phos of 425, unremarkable UA, normal renal function, and otherwise stable labs. Informed pt and family that CT abd/pel is concerning for metastatic disease of the bones (lytic and sclerotic lesions), enlarged prostate, and mild constipation. Informed them that the jefferson catheter malfunction could possibly have contributed to pt's sx today which is why we replaced the jefferson catheter. Also reviewed with them about plan to review case with Dr Todd (oncologist) in order to determine further course of care.    1415- Reviewed pt's history and workup with Dr. Loomis.  After a bedside evaluation; Dr Loomis agrees with the plan of care.     1435- Discussed case with Dr Todd (oncologist)   Reviewed history, exam, results and treatments.  Discussed concerns and plan of care. Dr Todd states that his office staff will call pt to arrange follow up appointment within 1 to 2 weeks.     1455- Rechecked pt. He continues to rest comfortably and remains in no acute distress. Informed pt and family about my conversation with Dr Todd (oncologist) who states that his office staff will call pt to arrange  follow up appointment within 1 to 2 weeks. Counseled pt and family on the importance of having pt f/u closely with Dr Todd for recheck of condition and for further management of CT abd/pel findings (bone metastases) and also with Dr Powell (urologist) regarding further management of jefferson catheter. RTER warnings given. Pt and family  "verbalize understanding and agreement with plan. Addressed all questions.          Disposition vitals:  /74   Pulse 75   Temp 98.6 °F (37 °C) (Tympanic)   Resp 16   Ht 180.3 cm (71\")   Wt 72.6 kg (160 lb 1.6 oz)   SpO2 98%   BMI 22.33 kg/m²       DIAGNOSIS  Final diagnoses:   Complication of Méndez catheter, initial encounter (CMS/[facility])   Bone lesion     DISCHARGE    Patient discharged in stable condition.    Reviewed implications of results, diagnosis, meds, responsibility to follow up, warning signs and symptoms of possible worsening, potential complications and reasons to return to ER.    Patient/Family voiced understanding of above instructions.    Discussed plan for discharge, as there is no emergent indication for admission.  Pt/family is agreeable and understands need for follow up and repeat testing.  Pt/family is aware that discharge does not mean that nothing is wrong but it indicates no emergency is present and that pt must continue care with follow-up as given below or physician of their choice.     FOLLOW-UP  James Todd MD  [facility] [address]  [phone]      the office will call you to schedule within 1-2 weeks      Documentation assistance provided by joshua Blue for Mellissa Starkey PA-C.  Information recorded by the scribe was done at my direction and has been verified and validated by me.         Conrad Blue  12/17/18 1542       Mellissa Starkey PA  12/19/18 0054    "

## 2018-12-17 NOTE — TELEPHONE ENCOUNTER
Pt's son wanted pts appt moved up since he was in the ED today. Per Dr Todd, he has already asked scheduling to set this up. Pt's son wanted the appt date and time be called to him so his mom doesn't get it confused. Message sent to scheduling to call pts son with the appt.

## 2018-12-17 NOTE — TELEPHONE ENCOUNTER
----- Message from James Todd MD sent at 12/17/2018  2:37 PM EST -----  Please move this patient's appointment up to be seen in the next 2-3 weeks, ThanksVINCE

## 2018-12-17 NOTE — TELEPHONE ENCOUNTER
Son called pt was sent to the er today again for obstruction with catheter he had abdominal pain and tender so home health decided to call ambulance to get him   [facility]  They will be there if you want to discuss that  With them   This is 5th time at hospital   # for his son [phone]

## 2018-12-17 NOTE — ED PROVIDER NOTES
Pt presents to the ED c/o lower abdominal pain and distention since yesterday. Pt reports he's had a jefferson catheter for some time, but noticed it was not draining correctly yesterday.    PHYSICAL EXAM  GENERAL: not distressed  HENT: nares patent  EYES: EOMI  CV: regular rhythm, regular rate  RESPIRATORY: normal effort  ABDOMEN: soft, nontender, nondistended, no rebound or guarding  : there is a jefferson catheter in place and flowing (1700 total CCs already removed).  MUSCULOSKELETAL: no deformity  NEURO: alert, oriented X 3  SKIN: warm, dry    Vital signs and nursing notes reviewed.    LAB RESULTS AND RADIOLOGY  I have reviewed the patient's labs and imaging studies.    PROCEDURE    PROGRESS NOTES  1511  Spoke to midlevel provider Deborah Starkey PA-C, about the pt. After performing my own physical exam, I agree w/ the plan of care. Discussed the plan to d/c w/ jefferson in placed and him to f/u w/ Dr. Roa, urology as scheduled. Pt given return to ED instructions. Pt understands and agrees with the plan, all questions answered.    Attestation:    The CARLA and I have discussed this patient's history, physical exam, and treatment plan.  I have reviewed the documentation and personally had a face to face interaction with the patient. I affirm the documentation and agree with the treatment and plan.  The attached note describes my personal findings.    Documentation assistance provided by joshua Soto for Dr. Loomis. Information recorded by the scrdelma was done at my direction and has been verified and validated by me.     Marline Soto  12/17/18 4741       Kel Loomis MD  12/17/18 6296

## 2018-12-17 NOTE — TELEPHONE ENCOUNTER
----- Message from Haleigh Rubio sent at 12/17/2018  2:40 PM EST -----  Contact: [phone]  Carter  Son   Pt has new lesions on his bones went to [facility] .Could appt be moved up.

## 2019-01-03 ENCOUNTER — TELEPHONE (OUTPATIENT)
Dept: FAMILY MEDICINE CLINIC | Facility: CLINIC | Age: 84
End: 2019-01-03

## 2019-01-03 NOTE — TELEPHONE ENCOUNTER
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

## 2019-01-04 ENCOUNTER — TELEPHONE (OUTPATIENT)
Dept: FAMILY MEDICINE CLINIC | Facility: CLINIC | Age: 84
End: 2019-01-04

## 2019-01-04 RX ORDER — MIRTAZAPINE 15 MG/1
15 TABLET, FILM COATED ORAL NIGHTLY
Qty: 30 TABLET | Refills: 5 | Status: ON HOLD | OUTPATIENT
Start: 2019-01-04 | End: 2019-01-12

## 2019-01-09 ENCOUNTER — OFFICE VISIT (OUTPATIENT)
Dept: ONCOLOGY | Facility: CLINIC | Age: 84
End: 2019-01-09

## 2019-01-09 ENCOUNTER — TELEPHONE (OUTPATIENT)
Dept: ONCOLOGY | Facility: HOSPITAL | Age: 84
End: 2019-01-09

## 2019-01-09 ENCOUNTER — LAB (OUTPATIENT)
Dept: OTHER | Facility: HOSPITAL | Age: 84
End: 2019-01-09

## 2019-01-09 VITALS
SYSTOLIC BLOOD PRESSURE: 116 MMHG | DIASTOLIC BLOOD PRESSURE: 73 MMHG | OXYGEN SATURATION: 100 % | BODY MASS INDEX: 20.71 KG/M2 | TEMPERATURE: 97.3 F | WEIGHT: 147.9 LBS | RESPIRATION RATE: 18 BRPM | HEIGHT: 71 IN | HEART RATE: 80 BPM

## 2019-01-09 DIAGNOSIS — D51.8 OTHER VITAMIN B12 DEFICIENCY ANEMIA: Primary | ICD-10-CM

## 2019-01-09 DIAGNOSIS — Z17.0 MALIGNANT NEOPLASM OF CENTRAL PORTION OF RIGHT BREAST IN MALE, ESTROGEN RECEPTOR POSITIVE (HCC): ICD-10-CM

## 2019-01-09 DIAGNOSIS — D50.0 IRON DEFICIENCY ANEMIA DUE TO CHRONIC BLOOD LOSS: ICD-10-CM

## 2019-01-09 DIAGNOSIS — C50.121 MALIGNANT NEOPLASM OF CENTRAL PORTION OF RIGHT BREAST IN MALE, ESTROGEN RECEPTOR POSITIVE (HCC): ICD-10-CM

## 2019-01-09 DIAGNOSIS — Z17.0 MALIGNANT NEOPLASM OF CENTRAL PORTION OF RIGHT BREAST IN MALE, ESTROGEN RECEPTOR POSITIVE (HCC): Primary | ICD-10-CM

## 2019-01-09 DIAGNOSIS — Z85.46 HISTORY OF PROSTATE CANCER: ICD-10-CM

## 2019-01-09 DIAGNOSIS — C50.121 MALIGNANT NEOPLASM OF CENTRAL PORTION OF RIGHT BREAST IN MALE, ESTROGEN RECEPTOR POSITIVE (HCC): Primary | ICD-10-CM

## 2019-01-09 LAB
ALBUMIN SERPL-MCNC: 3.7 G/DL (ref 3.5–5.2)
ALBUMIN/GLOB SERPL: 1.5 G/DL
ALP SERPL-CCNC: 441 U/L (ref 39–117)
ALT SERPL W P-5'-P-CCNC: 6 U/L (ref 1–41)
ANION GAP SERPL CALCULATED.3IONS-SCNC: 12 MMOL/L
AST SERPL-CCNC: 15 U/L (ref 1–40)
BASOPHILS # BLD AUTO: 0.07 10*3/MM3 (ref 0–0.2)
BASOPHILS NFR BLD AUTO: 0.8 % (ref 0–1.5)
BILIRUB SERPL-MCNC: 0.3 MG/DL (ref 0.1–1.2)
BUN BLD-MCNC: 16 MG/DL (ref 8–23)
BUN/CREAT SERPL: 20.3 (ref 7–25)
CALCIUM SPEC-SCNC: 8.7 MG/DL (ref 8.2–9.6)
CANCER AG15-3 SERPL-ACNC: 26.1 U/ML
CHLORIDE SERPL-SCNC: 100 MMOL/L (ref 98–107)
CO2 SERPL-SCNC: 27 MMOL/L (ref 22–29)
CREAT BLD-MCNC: 0.79 MG/DL (ref 0.76–1.27)
DEPRECATED RDW RBC AUTO: 54.3 FL (ref 37–54)
EOSINOPHIL # BLD AUTO: 0 10*3/MM3 (ref 0–0.7)
EOSINOPHIL NFR BLD AUTO: 0 % (ref 0.3–6.2)
ERYTHROCYTE [DISTWIDTH] IN BLOOD BY AUTOMATED COUNT: 15.7 % (ref 11.5–14.5)
FERRITIN SERPL-MCNC: 297.3 NG/ML (ref 30–400)
GFR SERPL CREATININE-BSD FRML MDRD: 92 ML/MIN/1.73
GLOBULIN UR ELPH-MCNC: 2.5 GM/DL
GLUCOSE BLD-MCNC: 127 MG/DL (ref 65–99)
HCT VFR BLD AUTO: 33.7 % (ref 40.4–52.2)
HGB BLD-MCNC: 10.7 G/DL (ref 13.7–17.6)
IMM GRANULOCYTES # BLD AUTO: 0.07 10*3/MM3 (ref 0–0.03)
IMM GRANULOCYTES NFR BLD AUTO: 0.8 % (ref 0–0.5)
IRON 24H UR-MRATE: 30 MCG/DL (ref 59–158)
IRON SATN MFR SERPL: 13 % (ref 20–50)
LDH SERPL-CCNC: 144 U/L (ref 135–225)
LYMPHOCYTES # BLD AUTO: 2.16 10*3/MM3 (ref 0.9–4.8)
LYMPHOCYTES NFR BLD AUTO: 24.1 % (ref 19.6–45.3)
MCH RBC QN AUTO: 30.3 PG (ref 27–32.7)
MCHC RBC AUTO-ENTMCNC: 31.8 G/DL (ref 32.6–36.4)
MCV RBC AUTO: 95.5 FL (ref 79.8–96.2)
MONOCYTES # BLD AUTO: 0.58 10*3/MM3 (ref 0.2–1.2)
MONOCYTES NFR BLD AUTO: 6.5 % (ref 5–12)
NEUTROPHILS # BLD AUTO: 6.08 10*3/MM3 (ref 1.9–8.1)
NEUTROPHILS NFR BLD AUTO: 67.8 % (ref 42.7–76)
NRBC BLD AUTO-RTO: 0 /100 WBC (ref 0–0)
PLATELET # BLD AUTO: 224 10*3/MM3 (ref 140–500)
PMV BLD AUTO: 9.8 FL (ref 6–12)
POTASSIUM BLD-SCNC: 4.2 MMOL/L (ref 3.5–5.2)
PROT SERPL-MCNC: 6.2 G/DL (ref 6–8.5)
PSA SERPL-MCNC: 2.46 NG/ML (ref 0–4)
RBC # BLD AUTO: 3.53 10*6/MM3 (ref 4.6–6)
SODIUM BLD-SCNC: 139 MMOL/L (ref 136–145)
TIBC SERPL-MCNC: 225 MCG/DL (ref 298–536)
TRANSFERRIN SERPL-MCNC: 151 MG/DL (ref 200–360)
WBC NRBC COR # BLD: 8.96 10*3/MM3 (ref 4.5–10.7)

## 2019-01-09 PROCEDURE — 86300 IMMUNOASSAY TUMOR CA 15-3: CPT | Performed by: INTERNAL MEDICINE

## 2019-01-09 PROCEDURE — 80053 COMPREHEN METABOLIC PANEL: CPT | Performed by: INTERNAL MEDICINE

## 2019-01-09 PROCEDURE — 36415 COLL VENOUS BLD VENIPUNCTURE: CPT

## 2019-01-09 PROCEDURE — 99215 OFFICE O/P EST HI 40 MIN: CPT | Performed by: INTERNAL MEDICINE

## 2019-01-09 PROCEDURE — 84466 ASSAY OF TRANSFERRIN: CPT | Performed by: INTERNAL MEDICINE

## 2019-01-09 PROCEDURE — 85025 COMPLETE CBC W/AUTO DIFF WBC: CPT | Performed by: INTERNAL MEDICINE

## 2019-01-09 PROCEDURE — 83540 ASSAY OF IRON: CPT | Performed by: INTERNAL MEDICINE

## 2019-01-09 PROCEDURE — 82728 ASSAY OF FERRITIN: CPT | Performed by: INTERNAL MEDICINE

## 2019-01-09 PROCEDURE — 84153 ASSAY OF PSA TOTAL: CPT | Performed by: INTERNAL MEDICINE

## 2019-01-09 PROCEDURE — 83615 LACTATE (LD) (LDH) ENZYME: CPT | Performed by: INTERNAL MEDICINE

## 2019-01-09 RX ORDER — FINASTERIDE 5 MG/1
TABLET, FILM COATED ORAL
Status: ON HOLD | COMMUNITY
Start: 2018-12-27 | End: 2019-01-12

## 2019-01-09 NOTE — TELEPHONE ENCOUNTER
----- Message from Rosaura Obrien sent at 1/9/2019  9:47 AM EST -----  Contact: [phone]  Pt's son wants to talk about his dad   Carter  Son wants to make sure Dr Todd can see scans from [facility]. Assured him we could. His father has an appointment today.

## 2019-01-09 NOTE — PROGRESS NOTES
Subjective   describes recent hospitalizations in rehabilitation.  REASON FOR CONSULTATION:  Male breast cancer  Provide an opinion on any further workup or treatment                             REQUESTING PHYSICIAN:  Evans Porter M.D., Marito Moser M.D.    History of Present Illness       Patient is a 89-year-old male followed by primary care with hypertension, B12 deficiency and hyperlipidemia.  He had presented to his physician August 23 with development of gynecomastia in the right breast.  He had previous several lipomas and saw his dermatologist who felt this might be a similar issue but that it should be assessed and ultimately additional exams were performed.  These included mammogram and ultrasound August 29 demonstrating a localized mass in the retroareolar right breast and associated nipple retraction and thickening measuring up to 1.8 x 2 cm correlating with right breast ultrasound when voiding subareolar mass was also found measuring 1.1 x 1.6.  This led to biopsy September 12, 2017 which was consistent with an invasive mammary carcinoma, intermediate grade with cancer involving multiple fragments spanning at least 8 mm.Biomarker analysis included ER of 90% and AZ 50%, HER-2 2+ leading to additional testing-HER-2/CEP 17 of 1.1 interpreted as negative.   The patient was seen by Dr. SAEED Moser of general surgery September 19 . it was decided to proceed with simple mastectomy and sentinel node biopsy.  This was performed on September 20.  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  The sentinel lymph node examined was negative with his subsequent staging being PT1c PN 0 MX-stage I disease .  The patient is seen after surgery and has recovered relatively quickly with little additional pain medication.  Now presents for concerns for adjuvant therapy.    The patient was reviewed October  19 and is felt his prognosis was actually felt to be overall good though this could be improved by adjuvant tamoxifen.  He was placed on 20 mg daily anticipated with for potentially 5 years.  She is now seen back December for tolerating it well with no particular side effect.  Both he and his wife state that he is doing about the same though it is also apparent that he has not yet been vaccinated with flu season and have urged him to have this done.   Mr. Wilde is seen with his wife March 06, 2018.  In discussing both very issues with them he has lost nearly 10 pounds and possibly more in the last several months.  He indicates he is not having significant discomfort  but that his appetite is poor.      With the patient's assessment March 6 he was found to have right axillary adenopathy.  This appeared to be a single node and a CT scan the chest was obtained revealing a 2.6 x 2.2 cm enlarged lymph node in the right axilla that was new from previous scanning.  There was not pathologically enlarged right axillary lymph nodes, no mediastinal, hilar or left axillary lymphadenopathy nor evidence of disease involving the right mastectomy site.  Lung windows showed no pulmonary nodules except an 8 mm nodule in the right lower lobe that was stable.  No other abnormalities were seen.  The patient was referred to Dr. SAEED Moser for surgical resection and when he is seen April 3 is scheduled for the procedure on April 6.       The patient did proceed on April 6 with excision of deep right axillary mass.  Operative report reads that the mass was easily palpable and did not have appearance of the lymph node.  It was excised completely and found to be fibroadipose tissue with benign capsule-evolving seroma.  The patient has done well postoperatively and continues to recover.  He is back on tamoxifen and tolerating it well.    Patient seen back August 22, 2018.  He has not had any additional side effects including lack of any  thrombotic issues.   The patient is next seen back January 09, 2019.  He unfortunately experienced a sore To fracture left femoral neck with superior displacement leading to a total hip arthroplasty November 08, 2018.   In further discussions with the patient, his caregiver and his son he also developed a degree of renal failure requiring assessment by urology and catheter placement, rehabilitation stay in a follow-up visit to the emergency room for these issues at [facility] December 19, 2018 with a CT scan showing urinary bladder to be compressed, prostatic enlargement, mild proctitis, and unexpectedly mixed lucent and sclerotic lesions within the pelvic bones consistent with metastatic disease.  These issues do not appear to have been addressed.  Past Medical History:   Diagnosis Date   • Arthritis    • BPH (benign prostatic hyperplasia)    • Breast cancer (CMS/HCC)     RIGHT   • Chronic low back pain    • Hard of hearing     LOKESH HEARING AIDS   • Lymph node enlargement     RIGHT AXILLA   • Melanoma (CMS/HCC)     FROM BACK   • Prostate cancer (CMS/HCC)     HISTORY RADIATION TREATMENT   • Seasonal allergies         Past Surgical History:   Procedure Laterality Date   • AXILLARY LYMPH NODE BIOPSY/EXCISION Right 4/6/2018    Procedure: AXILLARY LYMPH NODE BIOPSY/EXCISION, RIGHT;  Surgeon: Marito Cazares MD;  [place] [facility] [place] [facility] [place]  Service: General   • HERNIA REPAIR      LEFT AND RIGHT   • LAPAROSCOPIC CHOLECYSTECTOMY     • LUMBAR DECOMPRESSION     • MASTECTOMY Right 9/20/2017    Procedure: BREAST MASTECTOMY WITH SENTINEL NODE BIOPSY, RIGHT;  Surgeon: Marito Cazares MD;  [place] [facility] [place] [facility] [place]  Service:    • PARTIAL HIP ARTHROPLASTY Left 11/2018   • SKIN CANCER EXCISION      SEVERAL MELANOMA REMOVED FROM BACK        Current Outpatient Medications on File Prior to Visit   Medication Sig Dispense Refill   • Acetaminophen 500 MG coapsule      • ANTI-DIARRHEAL 2 MG capsule      • Cetirizine HCl 10 MG capsule      •  finasteride (PROSCAR) 5 MG tablet      • HYDROcodone-acetaminophen (NORCO) 5-325 MG per tablet Take 1-2 tablets by mouth Every 4 (Four) Hours As Needed (Pain) for up to 20 doses. 20 tablet 0   • mirtazapine (REMERON) 15 MG tablet Take 1 tablet by mouth Every Night. 30 tablet 5   • muscle rub (Omega-Darden) 10-15 % cream cream      • Probiotic Product (PROBIOTIC PO) Take 1 tablet by mouth Daily.     • SENNA LAXATIVE 25 MG tablet As needed     • tamoxifen (NOLVADEX) 20 MG chemo tablet Take 1 tablet by mouth Daily. 90 tablet 3   • Turmeric (CURCUMIN 95 PO) Take 1 tablet by mouth Daily. HELD FOR SURGERY       No current facility-administered medications on file prior to visit.         ALLERGIES:    Allergies   Allergen Reactions   • Penicillins Anaphylaxis   • Hydrocodone Mental Status Change        Social History     Socioeconomic History   • Marital status:      Spouse name: Natali   • Number of children: Not on file   • Years of education: College   • Highest education level: Not on file   Occupational History   • Occupation: Research criminologist     Employer: RETIRED   Tobacco Use   • Smoking status: Former Smoker     Years: 5.00     Types: Cigarettes   • Smokeless tobacco: Never Used   • Tobacco comment: ONLY FOR COUPLE YEARS QUIT EARLY 50'S   Substance and Sexual Activity   • Alcohol use: Yes     Comment: occasional   • Drug use: No   • Sexual activity: Defer        Family History   Problem Relation Age of Onset   • Prostate cancer Father    • Melanoma Father    • Heart disease Mother    • Malig Hyperthermia Neg Hx         Review of Systems   Gen.:, Weight loss noted after recent dietary change made as a result of needs of wife though this appears to be worsening  HEENT: Bilateral deafness  10 additional systems negative except for periodic low back pain recognized    Objective     Vitals:    01/09/19 1203   BP: 116/73   Pulse: 80   Resp: 18   Temp: 97.3 °F (36.3 °C)   TempSrc: Oral   SpO2: 100%   Weight: 67.1  "kg (147 lb 14.4 oz)   Height: 180.3 cm (70.98\")   PainSc:   6   PainLoc: Comment: Left side of face and ear     Current Status 1/9/2019   ECOG score 3       Physical Exam   Constitutional: He is oriented to person, place, and time. He appears well-developed and well-nourished. No distress.   HENT:   Head: Normocephalic and atraumatic.   Eyes: EOM are normal. Pupils are equal, round, and reactive to light.   Neck: Normal range of motion. Neck supple. No thyromegaly present.   Musculoskeletal: Normal range of motion.   Lymphadenopathy: no cervical, supraclavicular adenopathy, status post right axillary surgery with no additional adenopathy or mass palpable  Chest wall: Patient status post right simple mastectomy with healing suture line, no additional discharge or masses noted  Cardiovascular: Normal rate, regular rhythm, 2/6 systolic ejection murmur noted at apex   Neurological: He is alert and oriented to person, place, and time. No cranial nerve deficit. Coordination normal.         RECENT LABS:  Hematology WBC   Date Value Ref Range Status   12/17/2018 5.71 4.50 - 10.70 10*3/mm3 Final     RBC   Date Value Ref Range Status   12/17/2018 3.09 (L) 4.60 - 6.00 10*6/mm3 Final   10/29/2018 3.0  Final     Hemoglobin   Date Value Ref Range Status   12/17/2018 9.5 (L) 13.7 - 17.6 g/dL Final     Hematocrit   Date Value Ref Range Status   12/17/2018 29.4 (L) 40.4 - 52.2 % Final     Platelets   Date Value Ref Range Status   12/17/2018 254 140 - 500 10*3/mm3 Final      CT CHEST W CONTRAST- March 13, 2018  FINDINGS: There is a 2.6 x 2.2 cm diameter enlarged lymph node in the  right axilla that is new since the preceding CT scan chest. A few  additional shotty nonpathologically enlarged right axillary lymph nodes  are also noted. There is no mediastinal or hilar or left axillary  lymphadenopathy. There is no evidence of recurrent neoplasm at the site  of the right mastectomy. Lung window images demonstrate an 8 mm " diameter  noncalcified pulmonary nodule in the anterior aspect of the right lower  lobe that is stable. No other pulmonary nodules are identified. There  are no parenchymal infiltrates or pleural effusions. Images through the  upper abdomen show no significant abnormality. A tiny pericardial  effusion is noted.     IMPRESSION:  Solitary mildly enlarged right axillary lymph node  suspicious for localized metastatic disease in this patient with history  of breast carcinoma on the right. A noncalcified pulmonary nodule in the  right lower lobe has been stable since CT imaging dating back to  10/16/2014, and is therefore benign. There is no evidence of recurrent  neoplasm at the mastectomy site.    CT ABDOMEN AND PELVIS 12/17/18  FINDINGS: Bilateral small layering pleural effusions are present, left  greater than right. Subsegmental atelectatic change is seen within the  left lower lobe. There is a noncalcified unchanged 9 mm right lower lobe  nodule. Small pericardial effusion is seen. Noncontrast attenuation of  the liver is normal. The gallbladder is surgically absent. The spleen is  unremarkable. Diffuse fatty infiltration within the pancreas is seen.  Mild atherosclerotic change is seen within the abdominal aorta and its  branches particularly the splenic artery. Infrarenal abdominal aortic  ectasia measuring up to 2.6 cm. Dilatation of bilateral common iliac  arteries measuring 1.4 cm on the right and 1.6 cm on the left.     Bilateral adrenal gland and kidneys are normal. No renal calculi or  hydronephrosis.     Evaluation of the pelvis is limited secondary to streak artifact from  patient's left hip prosthesis. The urinary bladder is minimally  distended with presence of a urinary catheter. The prostate gland is  enlarged and indents the bladder base. The small and large bowel loops  demonstrate normal caliber. Moderate to large amounts of stool are seen  throughout the colon. Mild diffuse rectal wall thickening  may represent  mild proctitis. Sigmoid diverticulosis is present. A few of the distal  small bowel loops demonstrate air-fluid levels measuring up to 2.5 cm.  No evidence of bowel obstruction. No pathological retroperitoneal  lymphadenopathy. No ascites. There is diffuse heterogeneity to the  visualized bones particularly within the pelvic bones with there are  multiple new lucent lesions as well as areas of sclerosis. There is also  diffuse heterogeneity within the lumbar vertebral bodies. The findings  are most concerning for metastatic disease.     IMPRESSION:  The exam is limited by absence of contrast and streak  artifact from patient's left hip prosthesis.  1. Urinary bladder is decompressed with a Méndez catheter. The prostate  gland is enlarged and indents the bladder base. No renal calculi or  hydronephrosis.  2. Findings suggestive of constipation. Mild proctitis is suggested.  3. Mild air and fluid distention of the distal small bowel loops may  represent a ileus.  4. Bilateral small layering pleural effusions left greater than right  with basilar subsegmental atelectasis.  5. Mixed lucent and sclerotic lesions within the pelvic bones most  consistent with metastatic disease.    Assessment/Plan        89-year-old male followed with hypertension and previous surgeries including cholecystectomy and previous surgery for spinal stenosis who noted in the last several months development of unilateral gynecomastia on the right.  This was reviewed by physicians including his dermatologist to previously had assessed for lipomas but felt that this was a new abnormality.  His subsequent studies went on to reveal a mass localized in the retroareolar right breast with associated nipple retraction and thickening measuring up to 2 cm.  Subsequent biopsy was consistent with invasive mammary carcinoma of intermediate grade, ER positive at 90%, NJ at 50% and HER-2 negative.  This was surgically addressed by Gen. surgery  with simple mastectomy and sentinel node biopsy September 20.  The patient did extraordinarily well perioperatively has had no additional complications.  His subsequent pathology review reveals a stage I breast cancer- [id number].    The patient is seen for consultation October 19 and is reviewed with him the treatment of this particular disease-early stage breast cancer-is addressed the same in both men and women.  We reviewed his prognosis which is actually felt to be overall good plan that which could be improved by using adjuvant therapy and, in particular, the use of Tamoxifen.  He is not expected to have any serious complications to use this medication and is not on any additional medications or therapies that would interfere with its use.  He therefore agrees to a trial with plan to initiate 20 mg daily anticipating use for up to 5 years.  He is agreeable to this plan and will be seen back in the next 4-6 weeks coordinate his schedule with his wife was also seen in the practice.  Thank you very much for allowing me see this patient consultation and please let me know if any questions concerning this case.    The patient seen back December 05, 2015 tolerating tamoxifen well and would plan to continue same.  He'll be seen back in 3 months and interval he'll have a flu vaccination dose high-dose.    The patient is seen back March 06, 2018.  He has lost additional weight and also has an abnormality per his right axilla with adenopathy.  I discussed with him this could well be a recurrence.  We went on to have him assessed via CT scan of the chest as above and he was referred to surgery for removal on April 6.  This occurred with removal of right axillary mass found to be fibroadipose tissue with benign cyst-likely involving seroma.  There was no evidence of malignancy.  The patient is asked to restart tamoxifen and continue its use.  We'll plan to reassess him in approximately 4 months.     The patient is seen  back August 22, 201 tolerating tamoxifen well.  We'll plan to continue same and see him back in 6 months.    The patient is next seen January 09, 2019 with a complicated recent history including flank pain in November leading to an ER visit and findings likely of compression fracture, return home and a fall the following week now sustaining a subcapital hip fracture and required replacement, subsequent rehabilitation stay, issues with bladder outlet obstruction and assessment leading to catheterization and subsequent scans now demonstrate potential metastatic disease within the pelvic bones.  This particular finding is not discussed on scans done at [facility] in November?.  The patient and his son are concerned, understandably, as to what happens now.  We have suggested that he undergo additional testing for PSA, CA 15-3, serum chemistries and iron studies at which time we may need to proceed with additional treatments to try to control what may well be metastatic disease-suspected to be breast cancer.  He would then be seen back in approximate 4-5 weeks.  All in agreement with this plan.  *Additional laboratory studies today  *Patient has 24-hour care  *Return to office in 4-5 weeks

## 2019-01-12 ENCOUNTER — HOSPITAL ENCOUNTER (INPATIENT)
Facility: HOSPITAL | Age: 84
LOS: 9 days | Discharge: HOME HEALTH | End: 2019-01-21
Attending: EMERGENCY MEDICINE | Admitting: INTERNAL MEDICINE

## 2019-01-12 ENCOUNTER — APPOINTMENT (OUTPATIENT)
Dept: GENERAL RADIOLOGY | Facility: HOSPITAL | Age: 84
End: 2019-01-12

## 2019-01-12 ENCOUNTER — APPOINTMENT (OUTPATIENT)
Dept: CT IMAGING | Facility: HOSPITAL | Age: 84
End: 2019-01-12

## 2019-01-12 DIAGNOSIS — S42.201A CLOSED TRAUMATIC DISPLACED FRACTURE OF PROXIMAL END OF RIGHT HUMERUS, INITIAL ENCOUNTER: Primary | ICD-10-CM

## 2019-01-12 DIAGNOSIS — D63.8 ANEMIA, CHRONIC DISEASE: ICD-10-CM
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
## 2019-01-12 DIAGNOSIS — D50.0 IRON DEFICIENCY ANEMIA DUE TO CHRONIC BLOOD LOSS: ICD-10-CM

## 2019-01-12 DIAGNOSIS — Z74.09 IMPAIRED FUNCTIONAL MOBILITY, BALANCE, GAIT, AND ENDURANCE: ICD-10-CM

## 2019-01-12 DIAGNOSIS — C50.919 METASTATIC BREAST CANCER: ICD-10-CM

## 2019-01-12 DIAGNOSIS — S42.201D CLOSED TRAUMATIC DISPLACED FRACTURE OF PROXIMAL END OF RIGHT HUMERUS WITH ROUTINE HEALING, SUBSEQUENT ENCOUNTER: ICD-10-CM

## 2019-01-12 DIAGNOSIS — S00.01XA ABRASION, SCALP WITHOUT INFECTION: ICD-10-CM

## 2019-01-12 DIAGNOSIS — Z97.8 CHRONIC INDWELLING FOLEY CATHETER: ICD-10-CM

## 2019-01-12 DIAGNOSIS — S09.90XA CLOSED HEAD INJURY, INITIAL ENCOUNTER: ICD-10-CM

## 2019-01-12 DIAGNOSIS — R52 INTRACTABLE PAIN: ICD-10-CM

## 2019-01-12 PROBLEM — M54.50 CHRONIC LOW BACK PAIN: Chronic | Status: ACTIVE | Noted: 2019-01-12

## 2019-01-12 PROBLEM — G89.29 CHRONIC LOW BACK PAIN: Chronic | Status: ACTIVE | Noted: 2019-01-12

## 2019-01-12 PROBLEM — R82.90 ABNORMAL URINALYSIS: Status: ACTIVE | Noted: 2019-01-12

## 2019-01-12 PROBLEM — N40.0 BPH (BENIGN PROSTATIC HYPERPLASIA): Chronic | Status: ACTIVE | Noted: 2019-01-12

## 2019-01-12 LAB
ALBUMIN SERPL-MCNC: 3 G/DL (ref 3.5–5.2)
ALBUMIN/GLOB SERPL: 1.2 G/DL
ALP SERPL-CCNC: 397 U/L (ref 39–117)
ALT SERPL W P-5'-P-CCNC: 5 U/L (ref 1–41)
ANION GAP SERPL CALCULATED.3IONS-SCNC: 11.1 MMOL/L
AST SERPL-CCNC: 11 U/L (ref 1–40)
BACTERIA UR QL AUTO: ABNORMAL /HPF
BASOPHILS # BLD AUTO: 0.04 10*3/MM3 (ref 0–0.2)
BASOPHILS NFR BLD AUTO: 0.5 % (ref 0–1.5)
BILIRUB SERPL-MCNC: 0.4 MG/DL (ref 0.1–1.2)
BILIRUB UR QL STRIP: NEGATIVE
BUN BLD-MCNC: 14 MG/DL (ref 8–23)
BUN/CREAT SERPL: 23.3 (ref 7–25)
CALCIUM SPEC-SCNC: 8.4 MG/DL (ref 8.2–9.6)
CHLORIDE SERPL-SCNC: 102 MMOL/L (ref 98–107)
CLARITY UR: ABNORMAL
CO2 SERPL-SCNC: 24.9 MMOL/L (ref 22–29)
COD CRY URNS QL: ABNORMAL /HPF
COLOR UR: YELLOW
CREAT BLD-MCNC: 0.6 MG/DL (ref 0.76–1.27)
DEPRECATED RDW RBC AUTO: 57.5 FL (ref 37–54)
EOSINOPHIL # BLD AUTO: 0 10*3/MM3 (ref 0–0.7)
EOSINOPHIL NFR BLD AUTO: 0 % (ref 0.3–6.2)
ERYTHROCYTE [DISTWIDTH] IN BLOOD BY AUTOMATED COUNT: 16 % (ref 11.5–14.5)
GFR SERPL CREATININE-BSD FRML MDRD: 126 ML/MIN/1.73
GLOBULIN UR ELPH-MCNC: 2.6 GM/DL
GLUCOSE BLD-MCNC: 118 MG/DL (ref 65–99)
GLUCOSE UR STRIP-MCNC: NEGATIVE MG/DL
HCT VFR BLD AUTO: 30.9 % (ref 40.4–52.2)
HGB BLD-MCNC: 9.5 G/DL (ref 13.7–17.6)
HGB UR QL STRIP.AUTO: ABNORMAL
HYALINE CASTS UR QL AUTO: ABNORMAL /LPF
IMM GRANULOCYTES # BLD AUTO: 0.03 10*3/MM3 (ref 0–0.03)
IMM GRANULOCYTES NFR BLD AUTO: 0.4 % (ref 0–0.5)
KETONES UR QL STRIP: NEGATIVE
LEUKOCYTE ESTERASE UR QL STRIP.AUTO: ABNORMAL
LYMPHOCYTES # BLD AUTO: 2 10*3/MM3 (ref 0.9–4.8)
LYMPHOCYTES NFR BLD AUTO: 25.6 % (ref 19.6–45.3)
MCH RBC QN AUTO: 30.2 PG (ref 27–32.7)
MCHC RBC AUTO-ENTMCNC: 30.7 G/DL (ref 32.6–36.4)
MCV RBC AUTO: 98.1 FL (ref 79.8–96.2)
MONOCYTES # BLD AUTO: 0.46 10*3/MM3 (ref 0.2–1.2)
MONOCYTES NFR BLD AUTO: 5.9 % (ref 5–12)
NEUTROPHILS # BLD AUTO: 5.31 10*3/MM3 (ref 1.9–8.1)
NEUTROPHILS NFR BLD AUTO: 68 % (ref 42.7–76)
NITRITE UR QL STRIP: NEGATIVE
PH UR STRIP.AUTO: 5.5 [PH] (ref 5–8)
PLATELET # BLD AUTO: 226 10*3/MM3 (ref 140–500)
PMV BLD AUTO: 8.8 FL (ref 6–12)
POTASSIUM BLD-SCNC: 3.5 MMOL/L (ref 3.5–5.2)
PROT SERPL-MCNC: 5.6 G/DL (ref 6–8.5)
PROT UR QL STRIP: ABNORMAL
RBC # BLD AUTO: 3.15 10*6/MM3 (ref 4.6–6)
RBC # UR: ABNORMAL /HPF
REF LAB TEST METHOD: ABNORMAL
SODIUM BLD-SCNC: 138 MMOL/L (ref 136–145)
SP GR UR STRIP: 1.01 (ref 1–1.03)
SQUAMOUS #/AREA URNS HPF: ABNORMAL /HPF
UROBILINOGEN UR QL STRIP: ABNORMAL
WBC NRBC COR # BLD: 7.81 10*3/MM3 (ref 4.5–10.7)
WBC UR QL AUTO: ABNORMAL /HPF

## 2019-01-12 PROCEDURE — 85025 COMPLETE CBC W/AUTO DIFF WBC: CPT | Performed by: NURSE PRACTITIONER

## 2019-01-12 PROCEDURE — 99285 EMERGENCY DEPT VISIT HI MDM: CPT

## 2019-01-12 PROCEDURE — 73060 X-RAY EXAM OF HUMERUS: CPT

## 2019-01-12 PROCEDURE — 90471 IMMUNIZATION ADMIN: CPT | Performed by: NURSE PRACTITIONER

## 2019-01-12 PROCEDURE — 25010000002 MORPHINE (PF) 10 MG/ML SOLUTION: Performed by: HOSPITALIST

## 2019-01-12 PROCEDURE — 25010000002 TDAP 5-2.5-18.5 LF-MCG/0.5 SUSPENSION: Performed by: NURSE PRACTITIONER

## 2019-01-12 PROCEDURE — 70450 CT HEAD/BRAIN W/O DYE: CPT

## 2019-01-12 PROCEDURE — 87086 URINE CULTURE/COLONY COUNT: CPT | Performed by: NURSE PRACTITIONER

## 2019-01-12 PROCEDURE — G0378 HOSPITAL OBSERVATION PER HR: HCPCS

## 2019-01-12 PROCEDURE — 71045 X-RAY EXAM CHEST 1 VIEW: CPT

## 2019-01-12 PROCEDURE — 90715 TDAP VACCINE 7 YRS/> IM: CPT | Performed by: NURSE PRACTITIONER

## 2019-01-12 PROCEDURE — 93010 ELECTROCARDIOGRAM REPORT: CPT | Performed by: INTERNAL MEDICINE

## 2019-01-12 PROCEDURE — 87077 CULTURE AEROBIC IDENTIFY: CPT | Performed by: NURSE PRACTITIONER

## 2019-01-12 PROCEDURE — 72125 CT NECK SPINE W/O DYE: CPT

## 2019-01-12 PROCEDURE — 73030 X-RAY EXAM OF SHOULDER: CPT

## 2019-01-12 PROCEDURE — 87186 SC STD MICRODIL/AGAR DIL: CPT | Performed by: NURSE PRACTITIONER

## 2019-01-12 PROCEDURE — 25010000002 ONDANSETRON PER 1 MG: Performed by: NURSE PRACTITIONER

## 2019-01-12 PROCEDURE — 80053 COMPREHEN METABOLIC PANEL: CPT | Performed by: NURSE PRACTITIONER

## 2019-01-12 PROCEDURE — 25010000002 HYDROMORPHONE PER 4 MG: Performed by: NURSE PRACTITIONER

## 2019-01-12 PROCEDURE — 93005 ELECTROCARDIOGRAM TRACING: CPT | Performed by: NURSE PRACTITIONER

## 2019-01-12 PROCEDURE — 81001 URINALYSIS AUTO W/SCOPE: CPT | Performed by: NURSE PRACTITIONER

## 2019-01-12 RX ORDER — FINASTERIDE 5 MG/1
5 TABLET, FILM COATED ORAL DAILY
Status: DISCONTINUED | OUTPATIENT
Start: 2019-01-12 | End: 2019-01-21 | Stop reason: HOSPADM

## 2019-01-12 RX ORDER — TAMSULOSIN HYDROCHLORIDE 0.4 MG/1
0.4 CAPSULE ORAL NIGHTLY
Status: DISCONTINUED | OUTPATIENT
Start: 2019-01-12 | End: 2019-01-21 | Stop reason: HOSPADM

## 2019-01-12 RX ORDER — ONDANSETRON 4 MG/1
4 TABLET, FILM COATED ORAL EVERY 6 HOURS PRN
Status: DISCONTINUED | OUTPATIENT
Start: 2019-01-12 | End: 2019-01-21 | Stop reason: HOSPADM

## 2019-01-12 RX ORDER — FINASTERIDE 5 MG/1
5 TABLET, FILM COATED ORAL DAILY
COMMUNITY

## 2019-01-12 RX ORDER — ONDANSETRON 2 MG/ML
4 INJECTION INTRAMUSCULAR; INTRAVENOUS ONCE
Status: COMPLETED | OUTPATIENT
Start: 2019-01-12 | End: 2019-01-12

## 2019-01-12 RX ORDER — HYDROMORPHONE HYDROCHLORIDE 1 MG/ML
0.25 INJECTION, SOLUTION INTRAMUSCULAR; INTRAVENOUS; SUBCUTANEOUS ONCE
Status: COMPLETED | OUTPATIENT
Start: 2019-01-12 | End: 2019-01-12

## 2019-01-12 RX ORDER — SODIUM CHLORIDE 0.9 % (FLUSH) 0.9 %
3-10 SYRINGE (ML) INJECTION AS NEEDED
Status: DISCONTINUED | OUTPATIENT
Start: 2019-01-12 | End: 2019-01-21 | Stop reason: HOSPADM

## 2019-01-12 RX ORDER — MIRTAZAPINE 15 MG/1
15 TABLET, FILM COATED ORAL NIGHTLY
Status: DISCONTINUED | OUTPATIENT
Start: 2019-01-12 | End: 2019-01-21 | Stop reason: HOSPADM

## 2019-01-12 RX ORDER — MORPHINE SULFATE 2 MG/ML
1 INJECTION, SOLUTION INTRAMUSCULAR; INTRAVENOUS EVERY 4 HOURS PRN
Status: DISCONTINUED | OUTPATIENT
Start: 2019-01-12 | End: 2019-01-14

## 2019-01-12 RX ORDER — DOCUSATE SODIUM 250 MG
250 CAPSULE ORAL DAILY
Status: ON HOLD | COMMUNITY
End: 2019-01-12

## 2019-01-12 RX ORDER — FERROUS SULFATE 325(65) MG
325 TABLET ORAL
COMMUNITY

## 2019-01-12 RX ORDER — ASPIRIN 325 MG
325 TABLET ORAL DAILY
Status: ON HOLD | COMMUNITY
End: 2019-01-12

## 2019-01-12 RX ORDER — SODIUM CHLORIDE 0.9 % (FLUSH) 0.9 %
10 SYRINGE (ML) INJECTION AS NEEDED
Status: DISCONTINUED | OUTPATIENT
Start: 2019-01-12 | End: 2019-01-21 | Stop reason: HOSPADM

## 2019-01-12 RX ORDER — ONDANSETRON 4 MG/1
4 TABLET, ORALLY DISINTEGRATING ORAL EVERY 6 HOURS PRN
Status: DISCONTINUED | OUTPATIENT
Start: 2019-01-12 | End: 2019-01-21 | Stop reason: HOSPADM

## 2019-01-12 RX ORDER — ONDANSETRON 2 MG/ML
4 INJECTION INTRAMUSCULAR; INTRAVENOUS EVERY 6 HOURS PRN
Status: DISCONTINUED | OUTPATIENT
Start: 2019-01-12 | End: 2019-01-21 | Stop reason: HOSPADM

## 2019-01-12 RX ORDER — SULFAMETHOXAZOLE AND TRIMETHOPRIM 800; 160 MG/1; MG/1
1 TABLET ORAL DAILY
COMMUNITY

## 2019-01-12 RX ORDER — TAMOXIFEN CITRATE 10 MG/1
20 TABLET ORAL DAILY
Status: DISCONTINUED | OUTPATIENT
Start: 2019-01-12 | End: 2019-01-14

## 2019-01-12 RX ORDER — TAMSULOSIN HYDROCHLORIDE 0.4 MG/1
1 CAPSULE ORAL NIGHTLY
COMMUNITY

## 2019-01-12 RX ORDER — SODIUM CHLORIDE 0.9 % (FLUSH) 0.9 %
3 SYRINGE (ML) INJECTION EVERY 12 HOURS SCHEDULED
Status: DISCONTINUED | OUTPATIENT
Start: 2019-01-12 | End: 2019-01-21 | Stop reason: HOSPADM

## 2019-01-12 RX ORDER — NALOXONE HCL 0.4 MG/ML
0.4 VIAL (ML) INJECTION
Status: DISCONTINUED | OUTPATIENT
Start: 2019-01-12 | End: 2019-01-21 | Stop reason: HOSPADM

## 2019-01-12 RX ORDER — ACETAMINOPHEN 325 MG/1
650 TABLET ORAL EVERY 4 HOURS PRN
Status: DISCONTINUED | OUTPATIENT
Start: 2019-01-12 | End: 2019-01-21 | Stop reason: HOSPADM

## 2019-01-12 RX ORDER — CASTOR OIL AND BALSAM, PERU 788; 87 MG/G; MG/G
OINTMENT TOPICAL 2 TIMES DAILY
Status: ON HOLD | COMMUNITY
End: 2019-01-12

## 2019-01-12 RX ORDER — SULFAMETHOXAZOLE AND TRIMETHOPRIM 800; 160 MG/1; MG/1
1 TABLET ORAL DAILY
Status: DISCONTINUED | OUTPATIENT
Start: 2019-01-12 | End: 2019-01-14

## 2019-01-12 RX ORDER — MIRTAZAPINE 15 MG/1
15 TABLET, FILM COATED ORAL NIGHTLY
COMMUNITY

## 2019-01-12 RX ADMIN — MORPHINE SULFATE 1 MG: 10 INJECTION INTRAVENOUS at 20:16

## 2019-01-12 RX ADMIN — HYDROMORPHONE HYDROCHLORIDE 0.25 MG: 10 INJECTION INTRAMUSCULAR; INTRAVENOUS; SUBCUTANEOUS at 08:55

## 2019-01-12 RX ADMIN — SULFAMETHOXAZOLE AND TRIMETHOPRIM 160 MG: 800; 160 TABLET ORAL at 20:20

## 2019-01-12 RX ADMIN — ONDANSETRON 4 MG: 2 INJECTION INTRAMUSCULAR; INTRAVENOUS at 08:54

## 2019-01-12 RX ADMIN — HYDROMORPHONE HYDROCHLORIDE 0.25 MG: 1 INJECTION, SOLUTION INTRAMUSCULAR; INTRAVENOUS; SUBCUTANEOUS at 09:58

## 2019-01-12 RX ADMIN — MIRTAZAPINE 15 MG: 15 TABLET, FILM COATED ORAL at 20:20

## 2019-01-12 RX ADMIN — SODIUM CHLORIDE, PRESERVATIVE FREE 3 ML: 5 INJECTION INTRAVENOUS at 20:20

## 2019-01-12 RX ADMIN — TAMOXIFEN CITRATE 20 MG: 10 TABLET, FILM COATED ORAL at 20:19

## 2019-01-12 RX ADMIN — TETANUS TOXOID, REDUCED DIPHTHERIA TOXOID AND ACELLULAR PERTUSSIS VACCINE, ADSORBED 0.5 ML: 5; 2.5; 8; 8; 2.5 SUSPENSION INTRAMUSCULAR at 08:14

## 2019-01-12 RX ADMIN — TAMSULOSIN HYDROCHLORIDE 0.4 MG: 0.4 CAPSULE ORAL at 20:20

## 2019-01-12 RX ADMIN — FINASTERIDE 5 MG: 5 TABLET, FILM COATED ORAL at 20:19

## 2019-01-13 ENCOUNTER — APPOINTMENT (OUTPATIENT)
Dept: CARDIOLOGY | Facility: HOSPITAL | Age: 84
End: 2019-01-13
Attending: INTERNAL MEDICINE

## 2019-01-13 LAB
ALBUMIN SERPL-MCNC: 3.1 G/DL (ref 3.5–5.2)
ALBUMIN/GLOB SERPL: 1.1 G/DL
ALP SERPL-CCNC: 385 U/L (ref 39–117)
ALT SERPL W P-5'-P-CCNC: 7 U/L (ref 1–41)
ANION GAP SERPL CALCULATED.3IONS-SCNC: 13 MMOL/L
AST SERPL-CCNC: 13 U/L (ref 1–40)
BASOPHILS # BLD AUTO: 0.01 10*3/MM3 (ref 0–0.2)
BASOPHILS NFR BLD AUTO: 0.2 % (ref 0–1.5)
BH CV LOW VAS RIGHT GASTRONEMIUS VESSEL: 1
BH CV LOW VAS RIGHT PERONEAL VESSEL: 1
BH CV LOWER VASCULAR LEFT COMMON FEMORAL AUGMENT: NORMAL
BH CV LOWER VASCULAR LEFT COMMON FEMORAL COMPETENT: NORMAL
BH CV LOWER VASCULAR LEFT COMMON FEMORAL COMPRESS: NORMAL
BH CV LOWER VASCULAR LEFT COMMON FEMORAL PHASIC: NORMAL
BH CV LOWER VASCULAR LEFT COMMON FEMORAL SPONT: NORMAL
BH CV LOWER VASCULAR LEFT DISTAL FEMORAL COMPRESS: NORMAL
BH CV LOWER VASCULAR LEFT GASTRONEMIUS COMPRESS: NORMAL
BH CV LOWER VASCULAR LEFT GREATER SAPH BK COMPRESS: NORMAL
BH CV LOWER VASCULAR LEFT LESSER SAPH COMPRESS: NORMAL
BH CV LOWER VASCULAR LEFT MID FEMORAL AUGMENT: NORMAL
BH CV LOWER VASCULAR LEFT MID FEMORAL COMPETENT: NORMAL
BH CV LOWER VASCULAR LEFT MID FEMORAL COMPRESS: NORMAL
BH CV LOWER VASCULAR LEFT MID FEMORAL PHASIC: NORMAL
BH CV LOWER VASCULAR LEFT MID FEMORAL SPONT: NORMAL
BH CV LOWER VASCULAR LEFT PERONEAL COMPRESS: NORMAL
BH CV LOWER VASCULAR LEFT POPLITEAL AUGMENT: NORMAL
BH CV LOWER VASCULAR LEFT POPLITEAL COMPETENT: NORMAL
BH CV LOWER VASCULAR LEFT POPLITEAL COMPRESS: NORMAL
BH CV LOWER VASCULAR LEFT POPLITEAL PHASIC: NORMAL
BH CV LOWER VASCULAR LEFT POPLITEAL SPONT: NORMAL
BH CV LOWER VASCULAR LEFT POSTERIOR TIBIAL COMPRESS: NORMAL
BH CV LOWER VASCULAR LEFT PROXIMAL FEMORAL COMPRESS: NORMAL
BH CV LOWER VASCULAR RIGHT COMMON FEMORAL AUGMENT: NORMAL
BH CV LOWER VASCULAR RIGHT COMMON FEMORAL COMPETENT: NORMAL
BH CV LOWER VASCULAR RIGHT COMMON FEMORAL COMPRESS: NORMAL
BH CV LOWER VASCULAR RIGHT COMMON FEMORAL PHASIC: NORMAL
BH CV LOWER VASCULAR RIGHT COMMON FEMORAL SPONT: NORMAL
BH CV LOWER VASCULAR RIGHT DISTAL FEMORAL COMPRESS: NORMAL
BH CV LOWER VASCULAR RIGHT GASTRONEMIUS COMPRESS: NORMAL
BH CV LOWER VASCULAR RIGHT GASTRONEMIUS THROMBUS: NORMAL
BH CV LOWER VASCULAR RIGHT GREATER SAPH AK COMPRESS: NORMAL
BH CV LOWER VASCULAR RIGHT GREATER SAPH BK COMPRESS: NORMAL
BH CV LOWER VASCULAR RIGHT LESSER SAPH COMPRESS: NORMAL
BH CV LOWER VASCULAR RIGHT MID FEMORAL AUGMENT: NORMAL
BH CV LOWER VASCULAR RIGHT MID FEMORAL COMPETENT: NORMAL
BH CV LOWER VASCULAR RIGHT MID FEMORAL COMPRESS: NORMAL
BH CV LOWER VASCULAR RIGHT MID FEMORAL PHASIC: NORMAL
BH CV LOWER VASCULAR RIGHT MID FEMORAL SPONT: NORMAL
BH CV LOWER VASCULAR RIGHT PERONEAL COMPRESS: NORMAL
BH CV LOWER VASCULAR RIGHT PERONEAL THROMBUS: NORMAL
BH CV LOWER VASCULAR RIGHT POPLITEAL AUGMENT: NORMAL
BH CV LOWER VASCULAR RIGHT POPLITEAL COMPETENT: NORMAL
BH CV LOWER VASCULAR RIGHT POPLITEAL COMPRESS: NORMAL
BH CV LOWER VASCULAR RIGHT POPLITEAL PHASIC: NORMAL
BH CV LOWER VASCULAR RIGHT POPLITEAL SPONT: NORMAL
BH CV LOWER VASCULAR RIGHT POSTERIOR TIBIAL COMPRESS: NORMAL
BH CV LOWER VASCULAR RIGHT PROXIMAL FEMORAL COMPRESS: NORMAL
BH CV LOWER VASCULAR RIGHT SAPHENOFEMORAL JUNCTION AUGMENT: NORMAL
BH CV LOWER VASCULAR RIGHT SAPHENOFEMORAL JUNCTION COMPETENT: NORMAL
BH CV LOWER VASCULAR RIGHT SAPHENOFEMORAL JUNCTION COMPRESS: NORMAL
BH CV LOWER VASCULAR RIGHT SAPHENOFEMORAL JUNCTION PHASIC: NORMAL
BH CV LOWER VASCULAR RIGHT SAPHENOFEMORAL JUNCTION SPONT: NORMAL
BILIRUB SERPL-MCNC: 0.6 MG/DL (ref 0.1–1.2)
BUN BLD-MCNC: 16 MG/DL (ref 8–23)
BUN/CREAT SERPL: 28.6 (ref 7–25)
CALCIUM SPEC-SCNC: 8.5 MG/DL (ref 8.2–9.6)
CHLORIDE SERPL-SCNC: 100 MMOL/L (ref 98–107)
CO2 SERPL-SCNC: 23 MMOL/L (ref 22–29)
CREAT BLD-MCNC: 0.56 MG/DL (ref 0.76–1.27)
DEPRECATED RDW RBC AUTO: 58.3 FL (ref 37–54)
EOSINOPHIL # BLD AUTO: 0 10*3/MM3 (ref 0–0.7)
EOSINOPHIL NFR BLD AUTO: 0 % (ref 0.3–6.2)
ERYTHROCYTE [DISTWIDTH] IN BLOOD BY AUTOMATED COUNT: 15.9 % (ref 11.5–14.5)
GFR SERPL CREATININE-BSD FRML MDRD: 136 ML/MIN/1.73
GLOBULIN UR ELPH-MCNC: 2.7 GM/DL
GLUCOSE BLD-MCNC: 95 MG/DL (ref 65–99)
HCT VFR BLD AUTO: 28.4 % (ref 40.4–52.2)
HGB BLD-MCNC: 8.5 G/DL (ref 13.7–17.6)
IMM GRANULOCYTES # BLD AUTO: 0.03 10*3/MM3 (ref 0–0.03)
IMM GRANULOCYTES NFR BLD AUTO: 0.5 % (ref 0–0.5)
LYMPHOCYTES # BLD AUTO: 1.27 10*3/MM3 (ref 0.9–4.8)
LYMPHOCYTES NFR BLD AUTO: 22.2 % (ref 19.6–45.3)
MCH RBC QN AUTO: 30.1 PG (ref 27–32.7)
MCHC RBC AUTO-ENTMCNC: 29.9 G/DL (ref 32.6–36.4)
MCV RBC AUTO: 100.7 FL (ref 79.8–96.2)
MONOCYTES # BLD AUTO: 0.4 10*3/MM3 (ref 0.2–1.2)
MONOCYTES NFR BLD AUTO: 7 % (ref 5–12)
NEUTROPHILS # BLD AUTO: 4.01 10*3/MM3 (ref 1.9–8.1)
NEUTROPHILS NFR BLD AUTO: 70.1 % (ref 42.7–76)
PLATELET # BLD AUTO: 210 10*3/MM3 (ref 140–500)
PMV BLD AUTO: 8.7 FL (ref 6–12)
POTASSIUM BLD-SCNC: 3.6 MMOL/L (ref 3.5–5.2)
PROT SERPL-MCNC: 5.8 G/DL (ref 6–8.5)
RBC # BLD AUTO: 2.82 10*6/MM3 (ref 4.6–6)
SODIUM BLD-SCNC: 136 MMOL/L (ref 136–145)
WBC NRBC COR # BLD: 5.72 10*3/MM3 (ref 4.5–10.7)

## 2019-01-13 PROCEDURE — 80053 COMPREHEN METABOLIC PANEL: CPT | Performed by: HOSPITALIST

## 2019-01-13 PROCEDURE — 25010000002 MORPHINE (PF) 10 MG/ML SOLUTION: Performed by: HOSPITALIST

## 2019-01-13 PROCEDURE — 97110 THERAPEUTIC EXERCISES: CPT

## 2019-01-13 PROCEDURE — 93970 EXTREMITY STUDY: CPT

## 2019-01-13 PROCEDURE — 85025 COMPLETE CBC W/AUTO DIFF WBC: CPT | Performed by: HOSPITALIST

## 2019-01-13 PROCEDURE — 25010000002 ENOXAPARIN PER 10 MG: Performed by: INTERNAL MEDICINE

## 2019-01-13 PROCEDURE — 97161 PT EVAL LOW COMPLEX 20 MIN: CPT

## 2019-01-13 PROCEDURE — 36415 COLL VENOUS BLD VENIPUNCTURE: CPT | Performed by: HOSPITALIST

## 2019-01-13 RX ADMIN — SODIUM CHLORIDE, PRESERVATIVE FREE 3 ML: 5 INJECTION INTRAVENOUS at 21:16

## 2019-01-13 RX ADMIN — FINASTERIDE 5 MG: 5 TABLET, FILM COATED ORAL at 10:24

## 2019-01-13 RX ADMIN — SULFAMETHOXAZOLE AND TRIMETHOPRIM 160 MG: 800; 160 TABLET ORAL at 10:25

## 2019-01-13 RX ADMIN — MIRTAZAPINE 15 MG: 15 TABLET, FILM COATED ORAL at 21:16

## 2019-01-13 RX ADMIN — MORPHINE SULFATE 1 MG: 10 INJECTION INTRAVENOUS at 15:07

## 2019-01-13 RX ADMIN — SODIUM CHLORIDE, PRESERVATIVE FREE 3 ML: 5 INJECTION INTRAVENOUS at 11:02

## 2019-01-13 RX ADMIN — TAMOXIFEN CITRATE 20 MG: 10 TABLET, FILM COATED ORAL at 10:24

## 2019-01-13 RX ADMIN — TAMSULOSIN HYDROCHLORIDE 0.4 MG: 0.4 CAPSULE ORAL at 21:15

## 2019-01-13 RX ADMIN — ENOXAPARIN SODIUM 60 MG: 60 INJECTION SUBCUTANEOUS at 21:16

## 2019-01-13 RX ADMIN — MORPHINE SULFATE 1 MG: 10 INJECTION INTRAVENOUS at 10:25

## 2019-01-13 RX ADMIN — MORPHINE SULFATE 1 MG: 10 INJECTION INTRAVENOUS at 02:36

## 2019-01-14 PROBLEM — R82.71 BACTERIURIA: Status: ACTIVE | Noted: 2019-01-12

## 2019-01-14 PROBLEM — E83.39 HYPOPHOSPHATEMIA: Status: ACTIVE | Noted: 2019-01-14

## 2019-01-14 PROBLEM — N30.00 ACUTE CYSTITIS: Status: ACTIVE | Noted: 2019-01-12

## 2019-01-14 PROBLEM — I82.401 ACUTE DEEP VEIN THROMBOSIS (DVT) OF RIGHT LOWER EXTREMITY (HCC): Status: ACTIVE | Noted: 2019-01-14

## 2019-01-14 LAB
ALBUMIN SERPL-MCNC: 3 G/DL (ref 3.5–5.2)
ALBUMIN/GLOB SERPL: 1.2 G/DL
ALP SERPL-CCNC: 344 U/L (ref 39–117)
ALT SERPL W P-5'-P-CCNC: 8 U/L (ref 1–41)
ANION GAP SERPL CALCULATED.3IONS-SCNC: 9.7 MMOL/L
AST SERPL-CCNC: 12 U/L (ref 1–40)
BACTERIA SPEC AEROBE CULT: ABNORMAL
BASOPHILS # BLD AUTO: 0.03 10*3/MM3 (ref 0–0.2)
BASOPHILS NFR BLD AUTO: 0.5 % (ref 0–1.5)
BILIRUB SERPL-MCNC: 0.5 MG/DL (ref 0.1–1.2)
BUN BLD-MCNC: 19 MG/DL (ref 8–23)
BUN/CREAT SERPL: 30.2 (ref 7–25)
CALCIUM SPEC-SCNC: 8.6 MG/DL (ref 8.2–9.6)
CHLORIDE SERPL-SCNC: 101 MMOL/L (ref 98–107)
CO2 SERPL-SCNC: 26.3 MMOL/L (ref 22–29)
CREAT BLD-MCNC: 0.63 MG/DL (ref 0.76–1.27)
DEPRECATED RDW RBC AUTO: 55.3 FL (ref 37–54)
EOSINOPHIL # BLD AUTO: 0 10*3/MM3 (ref 0–0.7)
EOSINOPHIL NFR BLD AUTO: 0 % (ref 0.3–6.2)
ERYTHROCYTE [DISTWIDTH] IN BLOOD BY AUTOMATED COUNT: 15.8 % (ref 11.5–14.5)
FOLATE SERPL-MCNC: 15.62 NG/ML (ref 4.78–24.2)
GFR SERPL CREATININE-BSD FRML MDRD: 119 ML/MIN/1.73
GGT SERPL-CCNC: 7 U/L (ref 8–61)
GLOBULIN UR ELPH-MCNC: 2.5 GM/DL
GLUCOSE BLD-MCNC: 98 MG/DL (ref 65–99)
HCT VFR BLD AUTO: 25.5 % (ref 40.4–52.2)
HGB BLD-MCNC: 8.1 G/DL (ref 13.7–17.6)
IMM GRANULOCYTES # BLD AUTO: 0.05 10*3/MM3 (ref 0–0.03)
IMM GRANULOCYTES NFR BLD AUTO: 0.8 % (ref 0–0.5)
LYMPHOCYTES # BLD AUTO: 1.25 10*3/MM3 (ref 0.9–4.8)
LYMPHOCYTES NFR BLD AUTO: 19 % (ref 19.6–45.3)
MCH RBC QN AUTO: 30.6 PG (ref 27–32.7)
MCHC RBC AUTO-ENTMCNC: 31.8 G/DL (ref 32.6–36.4)
MCV RBC AUTO: 96.2 FL (ref 79.8–96.2)
MONOCYTES # BLD AUTO: 0.55 10*3/MM3 (ref 0.2–1.2)
MONOCYTES NFR BLD AUTO: 8.4 % (ref 5–12)
NEUTROPHILS # BLD AUTO: 4.74 10*3/MM3 (ref 1.9–8.1)
NEUTROPHILS NFR BLD AUTO: 72.1 % (ref 42.7–76)
NRBC BLD AUTO-RTO: 0 /100 WBC (ref 0–0)
PHOSPHATE SERPL-MCNC: 0.8 MG/DL (ref 2.5–4.5)
PLATELET # BLD AUTO: 221 10*3/MM3 (ref 140–500)
PMV BLD AUTO: 9.3 FL (ref 6–12)
POTASSIUM BLD-SCNC: 3.5 MMOL/L (ref 3.5–5.2)
PROT SERPL-MCNC: 5.5 G/DL (ref 6–8.5)
RBC # BLD AUTO: 2.65 10*6/MM3 (ref 4.6–6)
SODIUM BLD-SCNC: 137 MMOL/L (ref 136–145)
T4 FREE SERPL-MCNC: 1.32 NG/DL (ref 0.93–1.7)
TSH SERPL DL<=0.05 MIU/L-ACNC: 4.75 MIU/ML (ref 0.27–4.2)
VIT B12 BLD-MCNC: 213 PG/ML (ref 211–946)
WBC NRBC COR # BLD: 6.57 10*3/MM3 (ref 4.5–10.7)

## 2019-01-14 PROCEDURE — 92610 EVALUATE SWALLOWING FUNCTION: CPT | Performed by: SPEECH-LANGUAGE PATHOLOGIST

## 2019-01-14 PROCEDURE — 84439 ASSAY OF FREE THYROXINE: CPT | Performed by: INTERNAL MEDICINE

## 2019-01-14 PROCEDURE — 25010000002 HYDROMORPHONE PER 4 MG: Performed by: INTERNAL MEDICINE

## 2019-01-14 PROCEDURE — 25010000002 CYANOCOBALAMIN PER 1000 MCG: Performed by: INTERNAL MEDICINE

## 2019-01-14 PROCEDURE — 97530 THERAPEUTIC ACTIVITIES: CPT

## 2019-01-14 PROCEDURE — 82607 VITAMIN B-12: CPT | Performed by: INTERNAL MEDICINE

## 2019-01-14 PROCEDURE — 84443 ASSAY THYROID STIM HORMONE: CPT | Performed by: INTERNAL MEDICINE

## 2019-01-14 PROCEDURE — 25010000002 MORPHINE (PF) 10 MG/ML SOLUTION: Performed by: HOSPITALIST

## 2019-01-14 PROCEDURE — 25010000002 VANCOMYCIN 10 G RECONSTITUTED SOLUTION: Performed by: INTERNAL MEDICINE

## 2019-01-14 PROCEDURE — 85025 COMPLETE CBC W/AUTO DIFF WBC: CPT | Performed by: INTERNAL MEDICINE

## 2019-01-14 PROCEDURE — 82977 ASSAY OF GGT: CPT | Performed by: INTERNAL MEDICINE

## 2019-01-14 PROCEDURE — 80053 COMPREHEN METABOLIC PANEL: CPT | Performed by: INTERNAL MEDICINE

## 2019-01-14 PROCEDURE — 25010000002 ENOXAPARIN PER 10 MG: Performed by: INTERNAL MEDICINE

## 2019-01-14 PROCEDURE — 84100 ASSAY OF PHOSPHORUS: CPT | Performed by: INTERNAL MEDICINE

## 2019-01-14 PROCEDURE — 97165 OT EVAL LOW COMPLEX 30 MIN: CPT

## 2019-01-14 PROCEDURE — 99222 1ST HOSP IP/OBS MODERATE 55: CPT | Performed by: INTERNAL MEDICINE

## 2019-01-14 PROCEDURE — 82746 ASSAY OF FOLIC ACID SERUM: CPT | Performed by: INTERNAL MEDICINE

## 2019-01-14 RX ORDER — HYDROMORPHONE HYDROCHLORIDE 1 MG/ML
0.5 INJECTION, SOLUTION INTRAMUSCULAR; INTRAVENOUS; SUBCUTANEOUS
Status: DISCONTINUED | OUTPATIENT
Start: 2019-01-14 | End: 2019-01-15

## 2019-01-14 RX ORDER — NITROFURANTOIN MACROCRYSTALS 50 MG/1
100 CAPSULE ORAL EVERY 6 HOURS SCHEDULED
Status: DISPENSED | OUTPATIENT
Start: 2019-01-14 | End: 2019-01-21

## 2019-01-14 RX ORDER — CYANOCOBALAMIN 1000 UG/ML
1000 INJECTION, SOLUTION INTRAMUSCULAR; SUBCUTANEOUS
Status: DISCONTINUED | OUTPATIENT
Start: 2019-01-14 | End: 2019-01-14

## 2019-01-14 RX ORDER — CYANOCOBALAMIN 1000 UG/ML
1000 INJECTION, SOLUTION INTRAMUSCULAR; SUBCUTANEOUS DAILY
Status: DISCONTINUED | OUTPATIENT
Start: 2019-01-14 | End: 2019-01-17

## 2019-01-14 RX ORDER — CHOLECALCIFEROL (VITAMIN D3) 125 MCG
5 CAPSULE ORAL NIGHTLY PRN
Status: DISCONTINUED | OUTPATIENT
Start: 2019-01-14 | End: 2019-01-21 | Stop reason: HOSPADM

## 2019-01-14 RX ORDER — SODIUM CHLORIDE 9 MG/ML
75 INJECTION, SOLUTION INTRAVENOUS CONTINUOUS
Status: DISCONTINUED | OUTPATIENT
Start: 2019-01-14 | End: 2019-01-21 | Stop reason: HOSPADM

## 2019-01-14 RX ORDER — NITROFURANTOIN MACROCRYSTALS 100 MG/1
100 CAPSULE ORAL 3 TIMES DAILY
Status: DISCONTINUED | OUTPATIENT
Start: 2019-01-14 | End: 2019-01-14

## 2019-01-14 RX ADMIN — ENOXAPARIN SODIUM 60 MG: 60 INJECTION SUBCUTANEOUS at 15:05

## 2019-01-14 RX ADMIN — MORPHINE SULFATE 1 MG: 10 INJECTION INTRAVENOUS at 03:59

## 2019-01-14 RX ADMIN — ENOXAPARIN SODIUM 60 MG: 60 INJECTION SUBCUTANEOUS at 22:30

## 2019-01-14 RX ADMIN — SODIUM CHLORIDE 75 ML/HR: 9 INJECTION, SOLUTION INTRAVENOUS at 17:26

## 2019-01-14 RX ADMIN — VANCOMYCIN HYDROCHLORIDE 1250 MG: 10 INJECTION, POWDER, LYOPHILIZED, FOR SOLUTION INTRAVENOUS at 17:27

## 2019-01-14 RX ADMIN — POTASSIUM PHOSPHATE, MONOBASIC AND POTASSIUM PHOSPHATE, DIBASIC 45 MMOL: 224; 236 INJECTION, SOLUTION INTRAVENOUS at 15:05

## 2019-01-14 RX ADMIN — CYANOCOBALAMIN 1000 MCG: 1000 INJECTION, SOLUTION INTRAMUSCULAR; SUBCUTANEOUS at 17:27

## 2019-01-14 RX ADMIN — HYDROMORPHONE HYDROCHLORIDE 0.5 MG: 1 INJECTION, SOLUTION INTRAMUSCULAR; INTRAVENOUS; SUBCUTANEOUS at 15:05

## 2019-01-14 RX ADMIN — SODIUM CHLORIDE, PRESERVATIVE FREE 3 ML: 5 INJECTION INTRAVENOUS at 22:25

## 2019-01-15 ENCOUNTER — APPOINTMENT (OUTPATIENT)
Dept: GENERAL RADIOLOGY | Facility: HOSPITAL | Age: 84
End: 2019-01-15

## 2019-01-15 ENCOUNTER — TELEPHONE (OUTPATIENT)
Dept: FAMILY MEDICINE CLINIC | Facility: CLINIC | Age: 84
End: 2019-01-15

## 2019-01-15 ENCOUNTER — APPOINTMENT (OUTPATIENT)
Dept: NUCLEAR MEDICINE | Facility: HOSPITAL | Age: 84
End: 2019-01-15

## 2019-01-15 LAB
ABO GROUP BLD: NORMAL
ANION GAP SERPL CALCULATED.3IONS-SCNC: 10.9 MMOL/L
BASOPHILS # BLD AUTO: 0.03 10*3/MM3 (ref 0–0.2)
BASOPHILS NFR BLD AUTO: 0.6 % (ref 0–1.5)
BLD GP AB SCN SERPL QL: NEGATIVE
BUN BLD-MCNC: 16 MG/DL (ref 8–23)
BUN/CREAT SERPL: 26.7 (ref 7–25)
CALCIUM SPEC-SCNC: 7.9 MG/DL (ref 8.2–9.6)
CHLORIDE SERPL-SCNC: 106 MMOL/L (ref 98–107)
CO2 SERPL-SCNC: 23.1 MMOL/L (ref 22–29)
CREAT BLD-MCNC: 0.6 MG/DL (ref 0.76–1.27)
DEPRECATED RDW RBC AUTO: 59.6 FL (ref 37–54)
EOSINOPHIL # BLD AUTO: 0 10*3/MM3 (ref 0–0.7)
EOSINOPHIL NFR BLD AUTO: 0 % (ref 0.3–6.2)
ERYTHROCYTE [DISTWIDTH] IN BLOOD BY AUTOMATED COUNT: 16.2 % (ref 11.5–14.5)
GFR SERPL CREATININE-BSD FRML MDRD: 126 ML/MIN/1.73
GLUCOSE BLD-MCNC: 76 MG/DL (ref 65–99)
HCT VFR BLD AUTO: 25.3 % (ref 40.4–52.2)
HGB BLD-MCNC: 7.7 G/DL (ref 13.7–17.6)
HGB RETIC QN AUTO: 29.7 PG (ref 32.7–38.6)
IMM GRANULOCYTES # BLD AUTO: 0.03 10*3/MM3 (ref 0–0.03)
IMM GRANULOCYTES NFR BLD AUTO: 0.6 % (ref 0–0.5)
IMM RETICS NFR: 21.8 % (ref 0.7–13.7)
LYMPHOCYTES # BLD AUTO: 1.28 10*3/MM3 (ref 0.9–4.8)
LYMPHOCYTES NFR BLD AUTO: 26.3 % (ref 19.6–45.3)
MCH RBC QN AUTO: 30.7 PG (ref 27–32.7)
MCHC RBC AUTO-ENTMCNC: 30.4 G/DL (ref 32.6–36.4)
MCV RBC AUTO: 100.8 FL (ref 79.8–96.2)
MONOCYTES # BLD AUTO: 0.42 10*3/MM3 (ref 0.2–1.2)
MONOCYTES NFR BLD AUTO: 8.6 % (ref 5–12)
NEUTROPHILS # BLD AUTO: 3.1 10*3/MM3 (ref 1.9–8.1)
NEUTROPHILS NFR BLD AUTO: 63.9 % (ref 42.7–76)
PHOSPHATE SERPL-MCNC: 2.2 MG/DL (ref 2.5–4.5)
PLATELET # BLD AUTO: 195 10*3/MM3 (ref 140–500)
PMV BLD AUTO: 8.7 FL (ref 6–12)
POTASSIUM BLD-SCNC: 3.7 MMOL/L (ref 3.5–5.2)
RBC # BLD AUTO: 2.51 10*6/MM3 (ref 4.6–6)
RETICS/RBC NFR AUTO: 2.47 % (ref 0.5–1.5)
RH BLD: POSITIVE
SODIUM BLD-SCNC: 140 MMOL/L (ref 136–145)
T&S EXPIRATION DATE: NORMAL
WBC NRBC COR # BLD: 4.86 10*3/MM3 (ref 4.5–10.7)

## 2019-01-15 PROCEDURE — 83921 ORGANIC ACID SINGLE QUANT: CPT | Performed by: INTERNAL MEDICINE

## 2019-01-15 PROCEDURE — 0 TECHNETIUM MEDRONATE KIT: Performed by: INTERNAL MEDICINE

## 2019-01-15 PROCEDURE — 86900 BLOOD TYPING SEROLOGIC ABO: CPT

## 2019-01-15 PROCEDURE — 36415 COLL VENOUS BLD VENIPUNCTURE: CPT | Performed by: INTERNAL MEDICINE

## 2019-01-15 PROCEDURE — 25010000002 CYANOCOBALAMIN PER 1000 MCG: Performed by: INTERNAL MEDICINE

## 2019-01-15 PROCEDURE — 86901 BLOOD TYPING SEROLOGIC RH(D): CPT

## 2019-01-15 PROCEDURE — 92610 EVALUATE SWALLOWING FUNCTION: CPT | Performed by: SPEECH-LANGUAGE PATHOLOGIST

## 2019-01-15 PROCEDURE — P9016 RBC LEUKOCYTES REDUCED: HCPCS

## 2019-01-15 PROCEDURE — 63710000001 DIPHENHYDRAMINE PER 50 MG: Performed by: INTERNAL MEDICINE

## 2019-01-15 PROCEDURE — 86850 RBC ANTIBODY SCREEN: CPT | Performed by: INTERNAL MEDICINE

## 2019-01-15 PROCEDURE — 85046 RETICYTE/HGB CONCENTRATE: CPT | Performed by: INTERNAL MEDICINE

## 2019-01-15 PROCEDURE — 99233 SBSQ HOSP IP/OBS HIGH 50: CPT | Performed by: INTERNAL MEDICINE

## 2019-01-15 PROCEDURE — 25010000002 ENOXAPARIN PER 10 MG: Performed by: INTERNAL MEDICINE

## 2019-01-15 PROCEDURE — 84100 ASSAY OF PHOSPHORUS: CPT | Performed by: INTERNAL MEDICINE

## 2019-01-15 PROCEDURE — 78306 BONE IMAGING WHOLE BODY: CPT

## 2019-01-15 PROCEDURE — A9503 TC99M MEDRONATE: HCPCS | Performed by: INTERNAL MEDICINE

## 2019-01-15 PROCEDURE — 74230 X-RAY XM SWLNG FUNCJ C+: CPT

## 2019-01-15 PROCEDURE — 86900 BLOOD TYPING SEROLOGIC ABO: CPT | Performed by: INTERNAL MEDICINE

## 2019-01-15 PROCEDURE — 83090 ASSAY OF HOMOCYSTEINE: CPT | Performed by: INTERNAL MEDICINE

## 2019-01-15 PROCEDURE — 36430 TRANSFUSION BLD/BLD COMPNT: CPT

## 2019-01-15 PROCEDURE — 80048 BASIC METABOLIC PNL TOTAL CA: CPT | Performed by: INTERNAL MEDICINE

## 2019-01-15 PROCEDURE — 25010000002 VANCOMYCIN 750 MG RECONSTITUTED SOLUTION: Performed by: INTERNAL MEDICINE

## 2019-01-15 PROCEDURE — 85025 COMPLETE CBC W/AUTO DIFF WBC: CPT | Performed by: INTERNAL MEDICINE

## 2019-01-15 PROCEDURE — 25010000002 FUROSEMIDE PER 20 MG: Performed by: INTERNAL MEDICINE

## 2019-01-15 PROCEDURE — 86901 BLOOD TYPING SEROLOGIC RH(D): CPT | Performed by: INTERNAL MEDICINE

## 2019-01-15 PROCEDURE — 92611 MOTION FLUOROSCOPY/SWALLOW: CPT | Performed by: SPEECH-LANGUAGE PATHOLOGIST

## 2019-01-15 PROCEDURE — 86923 COMPATIBILITY TEST ELECTRIC: CPT

## 2019-01-15 RX ORDER — DIPHENHYDRAMINE HCL 25 MG
25 CAPSULE ORAL ONCE
Status: COMPLETED | OUTPATIENT
Start: 2019-01-15 | End: 2019-01-15

## 2019-01-15 RX ORDER — TC 99M MEDRONATE 20 MG/10ML
22.6 INJECTION, POWDER, LYOPHILIZED, FOR SOLUTION INTRAVENOUS
Status: COMPLETED | OUTPATIENT
Start: 2019-01-15 | End: 2019-01-15

## 2019-01-15 RX ORDER — ACETAMINOPHEN 325 MG/1
650 TABLET ORAL ONCE
Status: COMPLETED | OUTPATIENT
Start: 2019-01-15 | End: 2019-01-15

## 2019-01-15 RX ORDER — FUROSEMIDE 10 MG/ML
20 INJECTION INTRAMUSCULAR; INTRAVENOUS ONCE
Status: COMPLETED | OUTPATIENT
Start: 2019-01-15 | End: 2019-01-15

## 2019-01-15 RX ADMIN — BARIUM SULFATE 50 ML: 400 SUSPENSION ORAL at 08:37

## 2019-01-15 RX ADMIN — CYANOCOBALAMIN 1000 MCG: 1000 INJECTION, SOLUTION INTRAMUSCULAR; SUBCUTANEOUS at 10:36

## 2019-01-15 RX ADMIN — SODIUM CHLORIDE 750 MG: 900 INJECTION, SOLUTION INTRAVENOUS at 17:01

## 2019-01-15 RX ADMIN — BARIUM SULFATE 65 ML: 960 POWDER, FOR SUSPENSION ORAL at 08:37

## 2019-01-15 RX ADMIN — SODIUM CHLORIDE 75 ML/HR: 9 INJECTION, SOLUTION INTRAVENOUS at 23:12

## 2019-01-15 RX ADMIN — BARIUM SULFATE 4 ML: 980 POWDER, FOR SUSPENSION ORAL at 08:37

## 2019-01-15 RX ADMIN — SODIUM CHLORIDE, PRESERVATIVE FREE 3 ML: 5 INJECTION INTRAVENOUS at 22:13

## 2019-01-15 RX ADMIN — ENOXAPARIN SODIUM 60 MG: 60 INJECTION SUBCUTANEOUS at 19:51

## 2019-01-15 RX ADMIN — SODIUM CHLORIDE, PRESERVATIVE FREE 3 ML: 5 INJECTION INTRAVENOUS at 10:55

## 2019-01-15 RX ADMIN — ACETAMINOPHEN 650 MG: 325 TABLET, FILM COATED ORAL at 13:02

## 2019-01-15 RX ADMIN — FUROSEMIDE 20 MG: 10 INJECTION, SOLUTION INTRAMUSCULAR; INTRAVENOUS at 19:50

## 2019-01-15 RX ADMIN — ENOXAPARIN SODIUM 60 MG: 60 INJECTION SUBCUTANEOUS at 13:09

## 2019-01-15 RX ADMIN — DIPHENHYDRAMINE HYDROCHLORIDE 25 MG: 25 CAPSULE ORAL at 13:02

## 2019-01-15 RX ADMIN — Medication 22.6 MILLICURIE: at 07:15

## 2019-01-15 RX ADMIN — SODIUM CHLORIDE 750 MG: 900 INJECTION, SOLUTION INTRAVENOUS at 04:13

## 2019-01-15 NOTE — TELEPHONE ENCOUNTER
Zamzam at [facility] called for additional PT 2 x week for 2 more weeks. He is making progress.    I went ahead and told her yes. She wanted to go today. thanks

## 2019-01-16 LAB
ABO + RH BLD: NORMAL
ABO + RH BLD: NORMAL
BH BB BLOOD EXPIRATION DATE: NORMAL
BH BB BLOOD EXPIRATION DATE: NORMAL
BH BB BLOOD TYPE BARCODE: 6200
BH BB BLOOD TYPE BARCODE: 6200
BH BB DISPENSE STATUS: NORMAL
BH BB DISPENSE STATUS: NORMAL
BH BB PRODUCT CODE: NORMAL
BH BB PRODUCT CODE: NORMAL
BH BB UNIT NUMBER: NORMAL
BH BB UNIT NUMBER: NORMAL
HCYS SERPL-SCNC: 17.6 UMOL/L (ref 0–15)
UNIT  ABO: NORMAL
UNIT  ABO: NORMAL
UNIT  RH: NORMAL
UNIT  RH: NORMAL

## 2019-01-16 PROCEDURE — 25010000002 VANCOMYCIN 750 MG RECONSTITUTED SOLUTION: Performed by: INTERNAL MEDICINE

## 2019-01-16 PROCEDURE — 25010000002 CYANOCOBALAMIN PER 1000 MCG: Performed by: INTERNAL MEDICINE

## 2019-01-16 PROCEDURE — 99233 SBSQ HOSP IP/OBS HIGH 50: CPT | Performed by: INTERNAL MEDICINE

## 2019-01-16 PROCEDURE — 25010000002 ENOXAPARIN PER 10 MG: Performed by: INTERNAL MEDICINE

## 2019-01-16 RX ADMIN — SODIUM CHLORIDE 750 MG: 900 INJECTION, SOLUTION INTRAVENOUS at 03:15

## 2019-01-16 RX ADMIN — TAMSULOSIN HYDROCHLORIDE 0.4 MG: 0.4 CAPSULE ORAL at 20:43

## 2019-01-16 RX ADMIN — MIRTAZAPINE 15 MG: 15 TABLET, FILM COATED ORAL at 20:43

## 2019-01-16 RX ADMIN — ACETAMINOPHEN 650 MG: 325 TABLET, FILM COATED ORAL at 23:24

## 2019-01-16 RX ADMIN — SODIUM CHLORIDE, PRESERVATIVE FREE 3 ML: 5 INJECTION INTRAVENOUS at 10:12

## 2019-01-16 RX ADMIN — NITROFURANTOIN (MACROCRYSTALS) 100 MG: 50 CAPSULE ORAL at 23:24

## 2019-01-16 RX ADMIN — NITROFURANTOIN (MACROCRYSTALS) 100 MG: 50 CAPSULE ORAL at 17:31

## 2019-01-16 RX ADMIN — CYANOCOBALAMIN 1000 MCG: 1000 INJECTION, SOLUTION INTRAMUSCULAR; SUBCUTANEOUS at 10:11

## 2019-01-16 RX ADMIN — ENOXAPARIN SODIUM 60 MG: 60 INJECTION SUBCUTANEOUS at 10:11

## 2019-01-16 RX ADMIN — SODIUM CHLORIDE, PRESERVATIVE FREE 3 ML: 5 INJECTION INTRAVENOUS at 20:43

## 2019-01-17 LAB
ANION GAP SERPL CALCULATED.3IONS-SCNC: 12.7 MMOL/L
BUN BLD-MCNC: 14 MG/DL (ref 8–23)
BUN/CREAT SERPL: 30.4 (ref 7–25)
CALCIUM SPEC-SCNC: 8.2 MG/DL (ref 8.2–9.6)
CHLORIDE SERPL-SCNC: 108 MMOL/L (ref 98–107)
CO2 SERPL-SCNC: 20.3 MMOL/L (ref 22–29)
CREAT BLD-MCNC: 0.46 MG/DL (ref 0.76–1.27)
GFR SERPL CREATININE-BSD FRML MDRD: >150 ML/MIN/1.73
GLUCOSE BLD-MCNC: 79 MG/DL (ref 65–99)
HCT VFR BLD AUTO: 34.7 % (ref 40.4–52.2)
HGB BLD-MCNC: 10.5 G/DL (ref 13.7–17.6)
PHOSPHATE SERPL-MCNC: 1.3 MG/DL (ref 2.5–4.5)
POTASSIUM BLD-SCNC: 3 MMOL/L (ref 3.5–5.2)
SODIUM BLD-SCNC: 141 MMOL/L (ref 136–145)

## 2019-01-17 PROCEDURE — 25010000002 ENOXAPARIN PER 10 MG: Performed by: INTERNAL MEDICINE

## 2019-01-17 PROCEDURE — 84100 ASSAY OF PHOSPHORUS: CPT | Performed by: INTERNAL MEDICINE

## 2019-01-17 PROCEDURE — 99232 SBSQ HOSP IP/OBS MODERATE 35: CPT | Performed by: INTERNAL MEDICINE

## 2019-01-17 PROCEDURE — 25010000002 CYANOCOBALAMIN PER 1000 MCG: Performed by: INTERNAL MEDICINE

## 2019-01-17 PROCEDURE — 97110 THERAPEUTIC EXERCISES: CPT | Performed by: PHYSICAL THERAPIST

## 2019-01-17 PROCEDURE — 80048 BASIC METABOLIC PNL TOTAL CA: CPT | Performed by: INTERNAL MEDICINE

## 2019-01-17 PROCEDURE — 85018 HEMOGLOBIN: CPT | Performed by: INTERNAL MEDICINE

## 2019-01-17 PROCEDURE — 85014 HEMATOCRIT: CPT | Performed by: INTERNAL MEDICINE

## 2019-01-17 RX ORDER — POTASSIUM CHLORIDE 1.5 G/1.77G
40 POWDER, FOR SOLUTION ORAL AS NEEDED
Status: DISCONTINUED | OUTPATIENT
Start: 2019-01-17 | End: 2019-01-21 | Stop reason: HOSPADM

## 2019-01-17 RX ORDER — POTASSIUM CHLORIDE 7.45 MG/ML
10 INJECTION INTRAVENOUS
Status: DISCONTINUED | OUTPATIENT
Start: 2019-01-17 | End: 2019-01-21 | Stop reason: HOSPADM

## 2019-01-17 RX ORDER — POTASSIUM CHLORIDE 750 MG/1
40 CAPSULE, EXTENDED RELEASE ORAL AS NEEDED
Status: DISCONTINUED | OUTPATIENT
Start: 2019-01-17 | End: 2019-01-21 | Stop reason: HOSPADM

## 2019-01-17 RX ADMIN — SODIUM CHLORIDE, PRESERVATIVE FREE 3 ML: 5 INJECTION INTRAVENOUS at 10:16

## 2019-01-17 RX ADMIN — NITROFURANTOIN (MACROCRYSTALS) 100 MG: 50 CAPSULE ORAL at 17:46

## 2019-01-17 RX ADMIN — SODIUM CHLORIDE, PRESERVATIVE FREE 3 ML: 5 INJECTION INTRAVENOUS at 21:00

## 2019-01-17 RX ADMIN — POTASSIUM CHLORIDE 40 MEQ: 750 CAPSULE, EXTENDED RELEASE ORAL at 16:11

## 2019-01-17 RX ADMIN — POTASSIUM CHLORIDE 40 MEQ: 750 CAPSULE, EXTENDED RELEASE ORAL at 20:48

## 2019-01-17 RX ADMIN — FINASTERIDE 5 MG: 5 TABLET, FILM COATED ORAL at 10:15

## 2019-01-17 RX ADMIN — ACETAMINOPHEN 650 MG: 325 TABLET, FILM COATED ORAL at 20:43

## 2019-01-17 RX ADMIN — ACETAMINOPHEN 650 MG: 325 TABLET, FILM COATED ORAL at 15:38

## 2019-01-17 RX ADMIN — TAMSULOSIN HYDROCHLORIDE 0.4 MG: 0.4 CAPSULE ORAL at 20:43

## 2019-01-17 RX ADMIN — MIRTAZAPINE 15 MG: 15 TABLET, FILM COATED ORAL at 20:43

## 2019-01-17 RX ADMIN — NITROFURANTOIN (MACROCRYSTALS) 100 MG: 50 CAPSULE ORAL at 12:46

## 2019-01-17 RX ADMIN — ENOXAPARIN SODIUM 40 MG: 40 INJECTION SUBCUTANEOUS at 10:15

## 2019-01-17 RX ADMIN — SODIUM CHLORIDE 75 ML/HR: 9 INJECTION, SOLUTION INTRAVENOUS at 04:55

## 2019-01-17 RX ADMIN — NITROFURANTOIN (MACROCRYSTALS) 100 MG: 50 CAPSULE ORAL at 05:28

## 2019-01-17 RX ADMIN — CYANOCOBALAMIN 1000 MCG: 1000 INJECTION, SOLUTION INTRAMUSCULAR; SUBCUTANEOUS at 10:15

## 2019-01-17 RX ADMIN — NITROFURANTOIN (MACROCRYSTALS) 100 MG: 50 CAPSULE ORAL at 23:18

## 2019-01-18 PROCEDURE — 99231 SBSQ HOSP IP/OBS SF/LOW 25: CPT | Performed by: INTERNAL MEDICINE

## 2019-01-18 PROCEDURE — 25010000002 ENOXAPARIN PER 10 MG: Performed by: INTERNAL MEDICINE

## 2019-01-18 RX ADMIN — SODIUM CHLORIDE, PRESERVATIVE FREE 3 ML: 5 INJECTION INTRAVENOUS at 21:51

## 2019-01-18 RX ADMIN — NITROFURANTOIN (MACROCRYSTALS) 100 MG: 50 CAPSULE ORAL at 18:04

## 2019-01-18 RX ADMIN — SODIUM CHLORIDE, PRESERVATIVE FREE 3 ML: 5 INJECTION INTRAVENOUS at 08:22

## 2019-01-18 RX ADMIN — ENOXAPARIN SODIUM 40 MG: 40 INJECTION SUBCUTANEOUS at 09:46

## 2019-01-18 RX ADMIN — NITROFURANTOIN (MACROCRYSTALS) 100 MG: 50 CAPSULE ORAL at 12:35

## 2019-01-18 RX ADMIN — SODIUM CHLORIDE 75 ML/HR: 9 INJECTION, SOLUTION INTRAVENOUS at 08:22

## 2019-01-18 RX ADMIN — MIRTAZAPINE 15 MG: 15 TABLET, FILM COATED ORAL at 21:46

## 2019-01-18 RX ADMIN — FINASTERIDE 5 MG: 5 TABLET, FILM COATED ORAL at 09:39

## 2019-01-18 RX ADMIN — NITROFURANTOIN (MACROCRYSTALS) 100 MG: 50 CAPSULE ORAL at 05:51

## 2019-01-18 RX ADMIN — TAMSULOSIN HYDROCHLORIDE 0.4 MG: 0.4 CAPSULE ORAL at 21:46

## 2019-01-18 RX ADMIN — ACETAMINOPHEN 650 MG: 325 TABLET, FILM COATED ORAL at 01:58

## 2019-01-19 LAB
Lab: NORMAL
METHYLMALONATE SERPL-SCNC: 296 NMOL/L (ref 0–378)

## 2019-01-19 PROCEDURE — 25010000002 ENOXAPARIN PER 10 MG: Performed by: INTERNAL MEDICINE

## 2019-01-19 PROCEDURE — 97110 THERAPEUTIC EXERCISES: CPT

## 2019-01-19 RX ADMIN — ACETAMINOPHEN 650 MG: 325 TABLET, FILM COATED ORAL at 16:17

## 2019-01-19 RX ADMIN — SODIUM CHLORIDE, PRESERVATIVE FREE 3 ML: 5 INJECTION INTRAVENOUS at 10:32

## 2019-01-19 RX ADMIN — ENOXAPARIN SODIUM 40 MG: 40 INJECTION SUBCUTANEOUS at 09:40

## 2019-01-19 RX ADMIN — SODIUM CHLORIDE 75 ML/HR: 9 INJECTION, SOLUTION INTRAVENOUS at 12:44

## 2019-01-19 RX ADMIN — NITROFURANTOIN (MACROCRYSTALS) 100 MG: 50 CAPSULE ORAL at 23:42

## 2019-01-19 RX ADMIN — NITROFURANTOIN (MACROCRYSTALS) 100 MG: 50 CAPSULE ORAL at 18:26

## 2019-01-19 RX ADMIN — NITROFURANTOIN (MACROCRYSTALS) 100 MG: 50 CAPSULE ORAL at 12:46

## 2019-01-19 RX ADMIN — ACETAMINOPHEN 650 MG: 325 TABLET, FILM COATED ORAL at 21:21

## 2019-01-19 RX ADMIN — MIRTAZAPINE 15 MG: 15 TABLET, FILM COATED ORAL at 21:21

## 2019-01-19 RX ADMIN — TAMSULOSIN HYDROCHLORIDE 0.4 MG: 0.4 CAPSULE ORAL at 21:21

## 2019-01-19 RX ADMIN — SODIUM CHLORIDE, PRESERVATIVE FREE 3 ML: 5 INJECTION INTRAVENOUS at 21:24

## 2019-01-19 RX ADMIN — NITROFURANTOIN (MACROCRYSTALS) 100 MG: 50 CAPSULE ORAL at 06:43

## 2019-01-19 RX ADMIN — FINASTERIDE 5 MG: 5 TABLET, FILM COATED ORAL at 09:40

## 2019-01-20 LAB
ANION GAP SERPL CALCULATED.3IONS-SCNC: 9.2 MMOL/L
BASOPHILS # BLD AUTO: 0.03 10*3/MM3 (ref 0–0.2)
BASOPHILS NFR BLD AUTO: 0.7 % (ref 0–1.5)
BUN BLD-MCNC: 9 MG/DL (ref 8–23)
BUN/CREAT SERPL: 25.7 (ref 7–25)
CALCIUM SPEC-SCNC: 7.8 MG/DL (ref 8.2–9.6)
CHLORIDE SERPL-SCNC: 106 MMOL/L (ref 98–107)
CO2 SERPL-SCNC: 23.8 MMOL/L (ref 22–29)
CREAT BLD-MCNC: 0.35 MG/DL (ref 0.76–1.27)
DEPRECATED RDW RBC AUTO: 57.3 FL (ref 37–54)
EOSINOPHIL # BLD AUTO: 0.01 10*3/MM3 (ref 0–0.7)
EOSINOPHIL NFR BLD AUTO: 0.2 % (ref 0.3–6.2)
ERYTHROCYTE [DISTWIDTH] IN BLOOD BY AUTOMATED COUNT: 15.9 % (ref 11.5–14.5)
GFR SERPL CREATININE-BSD FRML MDRD: >150 ML/MIN/1.73
GLUCOSE BLD-MCNC: 98 MG/DL (ref 65–99)
HCT VFR BLD AUTO: 33.6 % (ref 40.4–52.2)
HGB BLD-MCNC: 10.4 G/DL (ref 13.7–17.6)
IMM GRANULOCYTES # BLD AUTO: 0.03 10*3/MM3 (ref 0–0.03)
IMM GRANULOCYTES NFR BLD AUTO: 0.7 % (ref 0–0.5)
LYMPHOCYTES # BLD AUTO: 1.63 10*3/MM3 (ref 0.9–4.8)
LYMPHOCYTES NFR BLD AUTO: 40.4 % (ref 19.6–45.3)
MCH RBC QN AUTO: 31 PG (ref 27–32.7)
MCHC RBC AUTO-ENTMCNC: 31 G/DL (ref 32.6–36.4)
MCV RBC AUTO: 100.3 FL (ref 79.8–96.2)
MONOCYTES # BLD AUTO: 0.33 10*3/MM3 (ref 0.2–1.2)
MONOCYTES NFR BLD AUTO: 8.2 % (ref 5–12)
NEUTROPHILS # BLD AUTO: 2 10*3/MM3 (ref 1.9–8.1)
NEUTROPHILS NFR BLD AUTO: 49.8 % (ref 42.7–76)
PLATELET # BLD AUTO: 221 10*3/MM3 (ref 140–500)
PMV BLD AUTO: 8.8 FL (ref 6–12)
POTASSIUM BLD-SCNC: 3.1 MMOL/L (ref 3.5–5.2)
RBC # BLD AUTO: 3.35 10*6/MM3 (ref 4.6–6)
SODIUM BLD-SCNC: 139 MMOL/L (ref 136–145)
WBC NRBC COR # BLD: 4.03 10*3/MM3 (ref 4.5–10.7)

## 2019-01-20 PROCEDURE — 80048 BASIC METABOLIC PNL TOTAL CA: CPT | Performed by: INTERNAL MEDICINE

## 2019-01-20 PROCEDURE — 85025 COMPLETE CBC W/AUTO DIFF WBC: CPT | Performed by: INTERNAL MEDICINE

## 2019-01-20 PROCEDURE — 25010000002 ENOXAPARIN PER 10 MG: Performed by: INTERNAL MEDICINE

## 2019-01-20 PROCEDURE — 99231 SBSQ HOSP IP/OBS SF/LOW 25: CPT | Performed by: INTERNAL MEDICINE

## 2019-01-20 RX ADMIN — SODIUM CHLORIDE 75 ML/HR: 9 INJECTION, SOLUTION INTRAVENOUS at 01:59

## 2019-01-20 RX ADMIN — FINASTERIDE 5 MG: 5 TABLET, FILM COATED ORAL at 09:05

## 2019-01-20 RX ADMIN — MIRTAZAPINE 15 MG: 15 TABLET, FILM COATED ORAL at 21:00

## 2019-01-20 RX ADMIN — ACETAMINOPHEN 650 MG: 325 TABLET, FILM COATED ORAL at 14:21

## 2019-01-20 RX ADMIN — TAMSULOSIN HYDROCHLORIDE 0.4 MG: 0.4 CAPSULE ORAL at 21:00

## 2019-01-20 RX ADMIN — NITROFURANTOIN (MACROCRYSTALS) 100 MG: 50 CAPSULE ORAL at 05:30

## 2019-01-20 RX ADMIN — NITROFURANTOIN (MACROCRYSTALS) 100 MG: 50 CAPSULE ORAL at 14:21

## 2019-01-20 RX ADMIN — ACETAMINOPHEN 650 MG: 325 TABLET, FILM COATED ORAL at 21:05

## 2019-01-20 RX ADMIN — SODIUM CHLORIDE 75 ML/HR: 9 INJECTION, SOLUTION INTRAVENOUS at 17:24

## 2019-01-20 RX ADMIN — ENOXAPARIN SODIUM 40 MG: 40 INJECTION SUBCUTANEOUS at 09:05

## 2019-01-20 RX ADMIN — SODIUM CHLORIDE, PRESERVATIVE FREE 3 ML: 5 INJECTION INTRAVENOUS at 09:05

## 2019-01-21 ENCOUNTER — TELEPHONE (OUTPATIENT)
Dept: FAMILY MEDICINE CLINIC | Facility: CLINIC | Age: 84
End: 2019-01-21

## 2019-01-21 VITALS
OXYGEN SATURATION: 96 % | TEMPERATURE: 96.7 F | WEIGHT: 152.7 LBS | HEIGHT: 71 IN | BODY MASS INDEX: 21.38 KG/M2 | HEART RATE: 67 BPM | SYSTOLIC BLOOD PRESSURE: 153 MMHG | DIASTOLIC BLOOD PRESSURE: 91 MMHG | RESPIRATION RATE: 18 BRPM

## 2019-01-21 PROBLEM — C50.919 METASTATIC BREAST CANCER: Status: ACTIVE | Noted: 2019-01-21

## 2019-01-21 LAB — POTASSIUM BLD-SCNC: 3.3 MMOL/L (ref 3.5–5.2)

## 2019-01-21 PROCEDURE — 97535 SELF CARE MNGMENT TRAINING: CPT

## 2019-01-21 PROCEDURE — 84132 ASSAY OF SERUM POTASSIUM: CPT | Performed by: INTERNAL MEDICINE

## 2019-01-21 PROCEDURE — 25010000002 ENOXAPARIN PER 10 MG: Performed by: INTERNAL MEDICINE

## 2019-01-21 RX ORDER — LANOLIN ALCOHOL/MO/W.PET/CERES
1000 CREAM (GRAM) TOPICAL DAILY
Qty: 100 TABLET | Refills: 0 | Status: SHIPPED | OUTPATIENT
Start: 2019-01-21

## 2019-01-21 RX ORDER — CHOLECALCIFEROL (VITAMIN D3) 125 MCG
5 CAPSULE ORAL NIGHTLY
COMMUNITY
Start: 2019-01-21

## 2019-01-21 RX ORDER — ACETAMINOPHEN 325 MG/1
650 TABLET ORAL EVERY 4 HOURS PRN
COMMUNITY
Start: 2019-01-21

## 2019-01-21 RX ORDER — FERROUS SULFATE 325(65) MG
325 TABLET ORAL
Status: DISCONTINUED | OUTPATIENT
Start: 2019-01-21 | End: 2019-01-21 | Stop reason: HOSPADM

## 2019-01-21 RX ADMIN — FINASTERIDE 5 MG: 5 TABLET, FILM COATED ORAL at 08:27

## 2019-01-21 RX ADMIN — FERROUS SULFATE TAB 325 MG (65 MG ELEMENTAL FE) 325 MG: 325 (65 FE) TAB at 14:13

## 2019-01-21 RX ADMIN — ACETAMINOPHEN 650 MG: 325 TABLET, FILM COATED ORAL at 14:17

## 2019-01-21 RX ADMIN — NITROFURANTOIN (MACROCRYSTALS) 100 MG: 50 CAPSULE ORAL at 11:46

## 2019-01-21 RX ADMIN — POTASSIUM CHLORIDE 40 MEQ: 750 CAPSULE, EXTENDED RELEASE ORAL at 11:46

## 2019-01-21 RX ADMIN — POTASSIUM CHLORIDE 40 MEQ: 750 CAPSULE, EXTENDED RELEASE ORAL at 17:08

## 2019-01-21 RX ADMIN — NITROFURANTOIN (MACROCRYSTALS) 100 MG: 50 CAPSULE ORAL at 06:48

## 2019-01-21 RX ADMIN — NITROFURANTOIN (MACROCRYSTALS) 100 MG: 50 CAPSULE ORAL at 00:14

## 2019-01-21 RX ADMIN — SODIUM CHLORIDE, PRESERVATIVE FREE 3 ML: 5 INJECTION INTRAVENOUS at 08:27

## 2019-01-21 RX ADMIN — ENOXAPARIN SODIUM 40 MG: 40 INJECTION SUBCUTANEOUS at 10:18

## 2019-01-21 RX ADMIN — ACETAMINOPHEN 650 MG: 325 TABLET, FILM COATED ORAL at 01:31

## 2019-01-22 ENCOUNTER — READMISSION MANAGEMENT (OUTPATIENT)
Dept: CALL CENTER | Facility: HOSPITAL | Age: 84
End: 2019-01-22

## 2019-01-22 RX ORDER — TAMOXIFEN CITRATE 20 MG/1
TABLET ORAL
Qty: 90 TABLET | Refills: 3 | OUTPATIENT
Start: 2019-01-22

## 2019-01-23 NOTE — OUTREACH NOTE
Prep Survey      Responses   Facility patient discharged from?  [facility]   Is patient eligible?  Yes   Discharge diagnosis  **Traumatic closed displaced fracture of proximal end of right humerus    Does the patient have one of the following disease processes/diagnoses(primary or secondary)?  Other   Does the patient have Home health ordered?  Yes   What is the Home health agency?   [facility]Pt is considering Hospice care   Is there a DME ordered?  Yes   What DME was ordered?  [employer] to deliver hospital bed and bedside commode   Prep survey completed?  Yes          Ramila Islas RN

## 2019-01-24 ENCOUNTER — READMISSION MANAGEMENT (OUTPATIENT)
Dept: CALL CENTER | Facility: HOSPITAL | Age: 84
End: 2019-01-24

## 2019-01-24 NOTE — OUTREACH NOTE
Medical Week 1 Survey      Responses   Facility patient discharged from?  [facility]   Does the patient have one of the following disease processes/diagnoses(primary or secondary)?  Other   Is there a successful TCM telephone encounter documented?  No   Week 1 attempt successful?  Yes   Call start time  1150   Call end time  1202   Discharge diagnosis  **Traumatic closed displaced fracture of proximal end of right humerus    Is patient permission given to speak with other caregiver?  Yes   Person spoke with today (if not patient) and relationship  Carter Wilde   Meds reviewed with patient/caregiver?  No   Is the patient having any side effects they believe may be caused by any medication additions or changes?  No   Does the patient have all medications ordered at discharge?  N/A   Is the patient taking all medications as directed (includes completed medication regime)?  N/A   Medication comments  Unable to address meds this call r/t me speaking about other issues today. Added Morphine and Dilaudid to allergies per sons request.   Does the patient have a primary care provider?   Yes   Does the patient have an appointment with their PCP within 7 days of discharge?  No   What is preventing the patient from scheduling follow up appointments within 7 days of discharge?  Haven't had time   Nursing Interventions  Advised patient to make appointment   Has the patient kept scheduled appointments due by today?  N/A   Comments  Reviewed appts.   What is the Home health agency?   [facility]-Pt is considering Hospice care   Has home health visited the patient within 72 hours of discharge?  Yes   Home health comments  HH coming over. No hospice yet.   Has all DME been delivered?  -- [Unable to address today.]   Psychosocial issues?  No   Comments  Son complained of a poor hospital stay experience.   Did the patient receive a copy of their discharge instructions?  Yes   Nursing interventions  Reviewed instructions with patient   What  is the patient's perception of their health status since discharge?  Improving   Is the patient/caregiver able to teach back signs and symptoms related to disease process for when to call PCP?  Yes   Is the patient/caregiver able to teach back signs and symptoms related to disease process for when to call 911?  Yes   Week 1 call completed?  Yes   Graduated  Yes          Edson Ling RN

## 2019-01-25 ENCOUNTER — TELEPHONE (OUTPATIENT)
Dept: FAMILY MEDICINE CLINIC | Facility: CLINIC | Age: 84
End: 2019-01-25

## 2019-01-25 NOTE — TELEPHONE ENCOUNTER
[facility] nurse called said pt is not on any aspirin and he had clot old and new .  Dr bolton agreed to be on baby aspirin   # [phone]

## 2019-02-04 ENCOUNTER — TELEPHONE (OUTPATIENT)
Dept: GENERAL RADIOLOGY | Facility: HOSPITAL | Age: 84
End: 2019-02-04

## 2019-02-04 NOTE — TELEPHONE ENCOUNTER
----- Message from Blanca Brown RN sent at 2/4/2019 11:22 AM EST -----  Please cancel pts appt for this Friday.   They have home health coming out who will draw labs and fax results.  They are just too weak to come in for now

## 2019-02-05 ENCOUNTER — LAB REQUISITION (OUTPATIENT)
Dept: LAB | Facility: HOSPITAL | Age: 84
End: 2019-02-05

## 2019-02-05 DIAGNOSIS — Z00.00 ENCOUNTER FOR GENERAL ADULT MEDICAL EXAMINATION WITHOUT ABNORMAL FINDINGS: ICD-10-CM

## 2019-02-05 LAB
BACTERIA UR QL AUTO: ABNORMAL /HPF
BILIRUB UR QL STRIP: NEGATIVE
CLARITY UR: ABNORMAL
COD CRY URNS QL: ABNORMAL /HPF
COLOR UR: YELLOW
GLUCOSE UR STRIP-MCNC: NEGATIVE MG/DL
HGB UR QL STRIP.AUTO: ABNORMAL
HYALINE CASTS UR QL AUTO: ABNORMAL /LPF
KETONES UR QL STRIP: NEGATIVE
LEUKOCYTE ESTERASE UR QL STRIP.AUTO: ABNORMAL
NITRITE UR QL STRIP: NEGATIVE
PH UR STRIP.AUTO: 6.5 [PH] (ref 5–8)
PROT UR QL STRIP: ABNORMAL
RBC # UR: ABNORMAL /HPF
REF LAB TEST METHOD: ABNORMAL
SP GR UR STRIP: 1.02 (ref 1–1.03)
SQUAMOUS #/AREA URNS HPF: ABNORMAL /HPF
UROBILINOGEN UR QL STRIP: ABNORMAL
WBC UR QL AUTO: ABNORMAL /HPF

## 2019-02-05 PROCEDURE — 87076 CULTURE ANAEROBE IDENT EACH: CPT | Performed by: INTERNAL MEDICINE

## 2019-02-05 PROCEDURE — 81001 URINALYSIS AUTO W/SCOPE: CPT | Performed by: INTERNAL MEDICINE

## 2019-02-05 PROCEDURE — 87086 URINE CULTURE/COLONY COUNT: CPT | Performed by: INTERNAL MEDICINE

## 2019-02-06 ENCOUNTER — APPOINTMENT (OUTPATIENT)
Dept: ONCOLOGY | Facility: CLINIC | Age: 84
End: 2019-02-06

## 2019-02-06 ENCOUNTER — APPOINTMENT (OUTPATIENT)
Dept: OTHER | Facility: HOSPITAL | Age: 84
End: 2019-02-06

## 2019-02-06 PROCEDURE — 81001 URINALYSIS AUTO W/SCOPE: CPT | Performed by: NURSE PRACTITIONER

## 2019-02-07 ENCOUNTER — LAB REQUISITION (OUTPATIENT)
Dept: LAB | Facility: HOSPITAL | Age: 84
End: 2019-02-07

## 2019-02-07 DIAGNOSIS — Z00.00 ENCOUNTER FOR GENERAL ADULT MEDICAL EXAMINATION WITHOUT ABNORMAL FINDINGS: ICD-10-CM

## 2019-02-07 LAB
BACTERIA SPEC AEROBE CULT: ABNORMAL
BACTERIA UR QL AUTO: ABNORMAL /HPF
BILIRUB UR QL STRIP: NEGATIVE
CLARITY UR: CLEAR
COLOR UR: YELLOW
GLUCOSE UR STRIP-MCNC: NEGATIVE MG/DL
HGB UR QL STRIP.AUTO: NEGATIVE
HYALINE CASTS UR QL AUTO: ABNORMAL /LPF
KETONES UR QL STRIP: NEGATIVE
LEUKOCYTE ESTERASE UR QL STRIP.AUTO: ABNORMAL
NITRITE UR QL STRIP: NEGATIVE
PH UR STRIP.AUTO: 6.5 [PH] (ref 5–8)
PROT UR QL STRIP: NEGATIVE
RBC # UR: ABNORMAL /HPF
REF LAB TEST METHOD: ABNORMAL
SP GR UR STRIP: 1.01 (ref 1–1.03)
SQUAMOUS #/AREA URNS HPF: ABNORMAL /HPF
UROBILINOGEN UR QL STRIP: ABNORMAL
WBC UR QL AUTO: ABNORMAL /HPF

## 2019-03-19 ENCOUNTER — LAB REQUISITION (OUTPATIENT)
Dept: LAB | Facility: HOSPITAL | Age: 84
End: 2019-03-19

## 2019-03-19 DIAGNOSIS — Z00.00 ENCOUNTER FOR GENERAL ADULT MEDICAL EXAMINATION WITHOUT ABNORMAL FINDINGS: ICD-10-CM

## 2019-03-19 LAB
ALBUMIN SERPL-MCNC: 3.2 G/DL (ref 3.5–5.2)
ALBUMIN/GLOB SERPL: 1.3 G/DL
ALP SERPL-CCNC: 391 U/L (ref 39–117)
ALT SERPL W P-5'-P-CCNC: 17 U/L (ref 1–41)
ANION GAP SERPL CALCULATED.3IONS-SCNC: 10.7 MMOL/L
AST SERPL-CCNC: 15 U/L (ref 1–40)
BASOPHILS # BLD AUTO: 0.05 10*3/MM3 (ref 0–0.2)
BASOPHILS NFR BLD AUTO: 0.8 % (ref 0–1.5)
BILIRUB SERPL-MCNC: 0.3 MG/DL (ref 0.2–1.2)
BUN BLD-MCNC: 13 MG/DL (ref 8–23)
BUN/CREAT SERPL: 35.1 (ref 7–25)
CALCIUM SPEC-SCNC: 8.4 MG/DL (ref 8.2–9.6)
CHLORIDE SERPL-SCNC: 101 MMOL/L (ref 98–107)
CHOLEST SERPL-MCNC: 132 MG/DL (ref 0–200)
CO2 SERPL-SCNC: 26.3 MMOL/L (ref 22–29)
CREAT BLD-MCNC: 0.37 MG/DL (ref 0.76–1.27)
DEPRECATED RDW RBC AUTO: 59.7 FL (ref 37–54)
EOSINOPHIL # BLD AUTO: 0.2 10*3/MM3 (ref 0–0.4)
EOSINOPHIL NFR BLD AUTO: 3 % (ref 0.3–6.2)
ERYTHROCYTE [DISTWIDTH] IN BLOOD BY AUTOMATED COUNT: 16.4 % (ref 12.3–15.4)
GFR SERPL CREATININE-BSD FRML MDRD: >150 ML/MIN/1.73
GLOBULIN UR ELPH-MCNC: 2.5 GM/DL
GLUCOSE BLD-MCNC: 110 MG/DL (ref 65–99)
HCT VFR BLD AUTO: 37 % (ref 37.5–51)
HDLC SERPL-MCNC: 35 MG/DL (ref 40–60)
HGB BLD-MCNC: 11.6 G/DL (ref 13–17.7)
IMM GRANULOCYTES # BLD AUTO: 0.06 10*3/MM3 (ref 0–0.05)
IMM GRANULOCYTES NFR BLD AUTO: 0.9 % (ref 0–0.5)
LDLC SERPL CALC-MCNC: 80 MG/DL (ref 0–100)
LDLC/HDLC SERPL: 2.27 {RATIO}
LYMPHOCYTES # BLD AUTO: 1.81 10*3/MM3 (ref 0.7–3.1)
LYMPHOCYTES NFR BLD AUTO: 27.2 % (ref 19.6–45.3)
MCH RBC QN AUTO: 31 PG (ref 26.6–33)
MCHC RBC AUTO-ENTMCNC: 31.4 G/DL (ref 31.5–35.7)
MCV RBC AUTO: 98.9 FL (ref 79–97)
MONOCYTES # BLD AUTO: 0.42 10*3/MM3 (ref 0.1–0.9)
MONOCYTES NFR BLD AUTO: 6.3 % (ref 5–12)
NEUTROPHILS # BLD AUTO: 4.12 10*3/MM3 (ref 1.4–7)
NEUTROPHILS NFR BLD AUTO: 61.8 % (ref 42.7–76)
NRBC BLD AUTO-RTO: 0 /100 WBC (ref 0–0)
PLATELET # BLD AUTO: 269 10*3/MM3 (ref 140–450)
PMV BLD AUTO: 10.1 FL (ref 6–12)
POTASSIUM BLD-SCNC: 3.4 MMOL/L (ref 3.5–5.2)
PROT SERPL-MCNC: 5.7 G/DL (ref 6–8.5)
RBC # BLD AUTO: 3.74 10*6/MM3 (ref 4.14–5.8)
SODIUM BLD-SCNC: 138 MMOL/L (ref 136–145)
TRIGL SERPL-MCNC: 87 MG/DL (ref 0–150)
VIT B12 BLD-MCNC: 741 PG/ML (ref 211–946)
VLDLC SERPL-MCNC: 17.4 MG/DL (ref 5–40)
WBC NRBC COR # BLD: 6.66 10*3/MM3 (ref 3.4–10.8)

## 2019-03-19 PROCEDURE — 80061 LIPID PANEL: CPT | Performed by: INTERNAL MEDICINE

## 2019-03-19 PROCEDURE — 85025 COMPLETE CBC W/AUTO DIFF WBC: CPT | Performed by: INTERNAL MEDICINE

## 2019-03-19 PROCEDURE — 80053 COMPREHEN METABOLIC PANEL: CPT | Performed by: INTERNAL MEDICINE

## 2019-03-19 PROCEDURE — 82607 VITAMIN B-12: CPT | Performed by: INTERNAL MEDICINE

## 2019-05-26 ENCOUNTER — LAB REQUISITION (OUTPATIENT)
Dept: LAB | Facility: HOSPITAL | Age: 84
End: 2019-05-26

## 2019-05-26 DIAGNOSIS — I10 ESSENTIAL (PRIMARY) HYPERTENSION: ICD-10-CM

## 2019-05-26 DIAGNOSIS — D64.9 ANEMIA: ICD-10-CM

## 2019-05-26 DIAGNOSIS — R33.8 OTHER RETENTION OF URINE: ICD-10-CM

## 2019-05-26 LAB
AMORPH URATE CRY URNS QL MICRO: ABNORMAL /HPF
ANION GAP SERPL CALCULATED.3IONS-SCNC: 6.7 MMOL/L
BACTERIA UR QL AUTO: ABNORMAL /HPF
BASOPHILS # BLD AUTO: 0.04 10*3/MM3 (ref 0–0.2)
BASOPHILS NFR BLD AUTO: 0.6 % (ref 0–1.5)
BILIRUB UR QL STRIP: NEGATIVE
BUN BLD-MCNC: 14 MG/DL (ref 8–23)
BUN/CREAT SERPL: 35 (ref 7–25)
CALCIUM SPEC-SCNC: 8.4 MG/DL (ref 8.2–9.6)
CHLORIDE SERPL-SCNC: 103 MMOL/L (ref 98–107)
CLARITY UR: ABNORMAL
CO2 SERPL-SCNC: 28.3 MMOL/L (ref 22–29)
COLOR UR: YELLOW
CREAT BLD-MCNC: 0.4 MG/DL (ref 0.76–1.27)
DEPRECATED RDW RBC AUTO: 53.1 FL (ref 37–54)
EOSINOPHIL # BLD AUTO: 0.35 10*3/MM3 (ref 0–0.4)
EOSINOPHIL NFR BLD AUTO: 4.8 % (ref 0.3–6.2)
ERYTHROCYTE [DISTWIDTH] IN BLOOD BY AUTOMATED COUNT: 14.4 % (ref 12.3–15.4)
GFR SERPL CREATININE-BSD FRML MDRD: >150 ML/MIN/1.73
GLUCOSE BLD-MCNC: 101 MG/DL (ref 65–99)
GLUCOSE UR STRIP-MCNC: NEGATIVE MG/DL
HCT VFR BLD AUTO: 33.7 % (ref 37.5–51)
HGB BLD-MCNC: 10.9 G/DL (ref 13–17.7)
HGB UR QL STRIP.AUTO: ABNORMAL
HYALINE CASTS UR QL AUTO: ABNORMAL /LPF
IMM GRANULOCYTES # BLD AUTO: 0.11 10*3/MM3 (ref 0–0.05)
IMM GRANULOCYTES NFR BLD AUTO: 1.5 % (ref 0–0.5)
KETONES UR QL STRIP: NEGATIVE
LEUKOCYTE ESTERASE UR QL STRIP.AUTO: ABNORMAL
LYMPHOCYTES # BLD AUTO: 1.67 10*3/MM3 (ref 0.7–3.1)
LYMPHOCYTES NFR BLD AUTO: 23.1 % (ref 19.6–45.3)
MCH RBC QN AUTO: 32.4 PG (ref 26.6–33)
MCHC RBC AUTO-ENTMCNC: 32.3 G/DL (ref 31.5–35.7)
MCV RBC AUTO: 100.3 FL (ref 79–97)
MONOCYTES # BLD AUTO: 0.46 10*3/MM3 (ref 0.1–0.9)
MONOCYTES NFR BLD AUTO: 6.4 % (ref 5–12)
NEUTROPHILS # BLD AUTO: 4.6 10*3/MM3 (ref 1.7–7)
NEUTROPHILS NFR BLD AUTO: 63.6 % (ref 42.7–76)
NITRITE UR QL STRIP: NEGATIVE
NRBC BLD AUTO-RTO: 0 /100 WBC (ref 0–0.2)
PH UR STRIP.AUTO: 8 [PH] (ref 4.5–8)
PLATELET # BLD AUTO: 217 10*3/MM3 (ref 140–450)
PMV BLD AUTO: 9.9 FL (ref 6–12)
POTASSIUM BLD-SCNC: 3.9 MMOL/L (ref 3.5–5.2)
PROT UR QL STRIP: ABNORMAL
RBC # BLD AUTO: 3.36 10*6/MM3 (ref 4.14–5.8)
RBC # UR: ABNORMAL /HPF
REF LAB TEST METHOD: ABNORMAL
SODIUM BLD-SCNC: 138 MMOL/L (ref 136–145)
SP GR UR STRIP: 1.01 (ref 1–1.03)
SQUAMOUS #/AREA URNS HPF: ABNORMAL /HPF
UROBILINOGEN UR QL STRIP: ABNORMAL
WBC NRBC COR # BLD: 7.23 10*3/MM3 (ref 3.4–10.8)
WBC UR QL AUTO: ABNORMAL /HPF

## 2019-05-26 PROCEDURE — 81001 URINALYSIS AUTO W/SCOPE: CPT | Performed by: INTERNAL MEDICINE

## 2019-05-26 PROCEDURE — 85025 COMPLETE CBC W/AUTO DIFF WBC: CPT | Performed by: INTERNAL MEDICINE

## 2019-05-26 PROCEDURE — 80048 BASIC METABOLIC PNL TOTAL CA: CPT | Performed by: INTERNAL MEDICINE

## 2019-05-26 PROCEDURE — 87086 URINE CULTURE/COLONY COUNT: CPT | Performed by: INTERNAL MEDICINE

## 2019-05-27 LAB — BACTERIA SPEC AEROBE CULT: NORMAL

## 2019-06-17 ENCOUNTER — LAB REQUISITION (OUTPATIENT)
Dept: LAB | Facility: HOSPITAL | Age: 84
End: 2019-06-17

## 2019-06-17 DIAGNOSIS — N40.1 ENLARGED PROSTATE WITH LOWER URINARY TRACT SYMPTOMS (LUTS): ICD-10-CM

## 2019-06-17 LAB
ALBUMIN SERPL-MCNC: 3.3 G/DL (ref 3.5–5.2)
ALBUMIN/GLOB SERPL: 1.6 G/DL
ALP SERPL-CCNC: 442 U/L (ref 39–117)
ALT SERPL W P-5'-P-CCNC: 10 U/L (ref 1–41)
ANION GAP SERPL CALCULATED.3IONS-SCNC: 10.8 MMOL/L
AST SERPL-CCNC: 15 U/L (ref 1–40)
BASOPHILS # BLD AUTO: 0.03 10*3/MM3 (ref 0–0.2)
BASOPHILS NFR BLD AUTO: 0.4 % (ref 0–1.5)
BILIRUB SERPL-MCNC: 0.3 MG/DL (ref 0.2–1.2)
BUN BLD-MCNC: 16 MG/DL (ref 8–23)
BUN/CREAT SERPL: 40 (ref 7–25)
CALCIUM SPEC-SCNC: 8.5 MG/DL (ref 8.2–9.6)
CHLORIDE SERPL-SCNC: 103 MMOL/L (ref 98–107)
CO2 SERPL-SCNC: 27.2 MMOL/L (ref 22–29)
CREAT BLD-MCNC: 0.4 MG/DL (ref 0.76–1.27)
DEPRECATED RDW RBC AUTO: 53.7 FL (ref 37–54)
EOSINOPHIL # BLD AUTO: 0.27 10*3/MM3 (ref 0–0.4)
EOSINOPHIL NFR BLD AUTO: 4 % (ref 0.3–6.2)
ERYTHROCYTE [DISTWIDTH] IN BLOOD BY AUTOMATED COUNT: 14.6 % (ref 12.3–15.4)
GFR SERPL CREATININE-BSD FRML MDRD: >150 ML/MIN/1.73
GLOBULIN UR ELPH-MCNC: 2.1 GM/DL
GLUCOSE BLD-MCNC: 103 MG/DL (ref 65–99)
HCT VFR BLD AUTO: 32.7 % (ref 37.5–51)
HGB BLD-MCNC: 10.4 G/DL (ref 13–17.7)
IMM GRANULOCYTES # BLD AUTO: 0.11 10*3/MM3 (ref 0–0.05)
IMM GRANULOCYTES NFR BLD AUTO: 1.6 % (ref 0–0.5)
LYMPHOCYTES # BLD AUTO: 1.54 10*3/MM3 (ref 0.7–3.1)
LYMPHOCYTES NFR BLD AUTO: 22.6 % (ref 19.6–45.3)
MCH RBC QN AUTO: 31.8 PG (ref 26.6–33)
MCHC RBC AUTO-ENTMCNC: 31.8 G/DL (ref 31.5–35.7)
MCV RBC AUTO: 100 FL (ref 79–97)
MONOCYTES # BLD AUTO: 0.46 10*3/MM3 (ref 0.1–0.9)
MONOCYTES NFR BLD AUTO: 6.7 % (ref 5–12)
NEUTROPHILS # BLD AUTO: 4.41 10*3/MM3 (ref 1.7–7)
NEUTROPHILS NFR BLD AUTO: 64.7 % (ref 42.7–76)
NRBC BLD AUTO-RTO: 0 /100 WBC (ref 0–0.2)
PLATELET # BLD AUTO: 218 10*3/MM3 (ref 140–450)
PMV BLD AUTO: 9.5 FL (ref 6–12)
POTASSIUM BLD-SCNC: 3.5 MMOL/L (ref 3.5–5.2)
PROT SERPL-MCNC: 5.4 G/DL (ref 6–8.5)
RBC # BLD AUTO: 3.27 10*6/MM3 (ref 4.14–5.8)
SODIUM BLD-SCNC: 141 MMOL/L (ref 136–145)
WBC NRBC COR # BLD: 6.82 10*3/MM3 (ref 3.4–10.8)

## 2019-06-17 PROCEDURE — 80053 COMPREHEN METABOLIC PANEL: CPT | Performed by: NURSE PRACTITIONER

## 2019-06-17 PROCEDURE — 85025 COMPLETE CBC W/AUTO DIFF WBC: CPT | Performed by: NURSE PRACTITIONER

## 2019-06-20 ENCOUNTER — LAB REQUISITION (OUTPATIENT)
Dept: LAB | Facility: HOSPITAL | Age: 84
End: 2019-06-20

## 2019-06-20 DIAGNOSIS — Z00.00 ENCOUNTER FOR GENERAL ADULT MEDICAL EXAMINATION WITHOUT ABNORMAL FINDINGS: ICD-10-CM

## 2019-06-20 LAB
BACTERIA UR QL AUTO: ABNORMAL /HPF
BILIRUB UR QL STRIP: NEGATIVE
CLARITY UR: CLEAR
COLOR UR: YELLOW
GLUCOSE UR STRIP-MCNC: NEGATIVE MG/DL
HGB UR QL STRIP.AUTO: ABNORMAL
HYALINE CASTS UR QL AUTO: ABNORMAL /LPF
KETONES UR QL STRIP: NEGATIVE
LEUKOCYTE ESTERASE UR QL STRIP.AUTO: ABNORMAL
NITRITE UR QL STRIP: NEGATIVE
PH UR STRIP.AUTO: 5.5 [PH] (ref 5–8)
PROT UR QL STRIP: ABNORMAL
RBC # UR: ABNORMAL /HPF
REF LAB TEST METHOD: ABNORMAL
SP GR UR STRIP: 1.02 (ref 1–1.03)
SQUAMOUS #/AREA URNS HPF: ABNORMAL /HPF
UROBILINOGEN UR QL STRIP: ABNORMAL
WBC UR QL AUTO: ABNORMAL /HPF

## 2019-06-20 PROCEDURE — 87086 URINE CULTURE/COLONY COUNT: CPT | Performed by: INTERNAL MEDICINE

## 2019-06-20 PROCEDURE — 81001 URINALYSIS AUTO W/SCOPE: CPT | Performed by: INTERNAL MEDICINE

## 2019-06-22 LAB — BACTERIA SPEC AEROBE CULT: NORMAL

## 2019-07-10 RX ORDER — MIRTAZAPINE 15 MG/1
15 TABLET, FILM COATED ORAL NIGHTLY
Qty: 90 TABLET | Refills: 5 | OUTPATIENT
Start: 2019-07-10

## 2024-03-27 NOTE — TELEPHONE ENCOUNTER
December 19, 2022       Patient: Eva Yi   YOB: 2002   Date of Visit: 12/19/2022         To Whom It May Concern:    In my medical opinion, I recommend that Eva Yi remain out of work until 12-    If you have any questions or concerns, please don't hesitate to call [phone]          Sincerely,          Brenda Ashford P.A.-C.  Electronically Signed     
Hanna from [facility] called [phone]    He is being d/c from hospital today and does not want [facility] right now. He did say he would continue nursing and pt if you will resume the orders. thanks  
Hanna notified   
ok
[[phone]]

## (undated) DEVICE — UNDYED BRAIDED (POLYGLACTIN 910), SYNTHETIC ABSORBABLE SUTURE: Brand: COATED VICRYL

## (undated) DEVICE — 3M™ STERI-STRIP™ COMPOUND BENZOIN TINCTURE 40 BAGS/CARTON 4 CARTONS/CASE C1544: Brand: 3M™ STERI-STRIP™

## (undated) DEVICE — PK PROC MINOR TOWER 40

## (undated) DEVICE — DRSNG SURESITE WNDW 4X4.5

## (undated) DEVICE — SUT VIC 3/0 SH 27IN J416H

## (undated) DEVICE — SUT SILK 2/0 TIES 18IN A185H

## (undated) DEVICE — PK CHST BRST MAJ 40

## (undated) DEVICE — GLV SURG BIOGEL LTX PF 7

## (undated) DEVICE — NDL HYPO PRECISIONGLIDE REG 25G 1 1/2

## (undated) DEVICE — VIOLET BRAIDED (POLYGLACTIN 910), SYNTHETIC ABSORBABLE SUTURE: Brand: COATED VICRYL

## (undated) DEVICE — APPL CHLORAPREP W/TINT 26ML ORNG

## (undated) DEVICE — BANDAGE,GAUZE,BULKEE II,4.5"X4.1YD,STRL: Brand: MEDLINE

## (undated) DEVICE — INTENDED FOR TISSUE SEPARATION, AND OTHER PROCEDURES THAT REQUIRE A SHARP SURGICAL BLADE TO PUNCTURE OR CUT.: Brand: BARD-PARKER ® CARBON RIB-BACK BLADES

## (undated) DEVICE — DRAPE,CHEST,FENES,15X10,STERIL: Brand: MEDLINE

## (undated) DEVICE — SPNG LAP 18X18IN LF STRL PK/5

## (undated) DEVICE — 3M™ STERI-STRIP™ REINFORCED ADHESIVE SKIN CLOSURES, R1547, 1/2 IN X 4 IN (12 MM X 100 MM), 6 STRIPS/ENVELOPE: Brand: 3M™ STERI-STRIP™

## (undated) DEVICE — SUT VIC 2/0 CT1 CR8 18IN J839D

## (undated) DEVICE — SUT SILK 2/0 SH 30IN K833H

## (undated) DEVICE — CVR TRANSD CIV FLX TPR 11.9 TO 3.8X61CM

## (undated) DEVICE — SPNG GZ WOVN 4X4IN 12PLY 10/BX STRL